# Patient Record
Sex: MALE | Race: WHITE | NOT HISPANIC OR LATINO | URBAN - METROPOLITAN AREA
[De-identification: names, ages, dates, MRNs, and addresses within clinical notes are randomized per-mention and may not be internally consistent; named-entity substitution may affect disease eponyms.]

---

## 2018-03-06 ENCOUNTER — OUTPATIENT (OUTPATIENT)
Dept: OUTPATIENT SERVICES | Facility: HOSPITAL | Age: 33
LOS: 1 days | Discharge: HOME | End: 2018-03-06

## 2018-03-06 DIAGNOSIS — E78.00 PURE HYPERCHOLESTEROLEMIA, UNSPECIFIED: ICD-10-CM

## 2019-01-02 ENCOUNTER — EMERGENCY (EMERGENCY)
Facility: HOSPITAL | Age: 34
LOS: 0 days | Discharge: LEFT BEFORE DISCHARGE | End: 2019-01-02
Attending: EMERGENCY MEDICINE | Admitting: EMERGENCY MEDICINE

## 2019-01-02 VITALS
RESPIRATION RATE: 18 BRPM | DIASTOLIC BLOOD PRESSURE: 81 MMHG | HEART RATE: 83 BPM | WEIGHT: 225.09 LBS | HEIGHT: 72 IN | TEMPERATURE: 98 F | SYSTOLIC BLOOD PRESSURE: 120 MMHG

## 2019-01-02 DIAGNOSIS — J02.9 ACUTE PHARYNGITIS, UNSPECIFIED: ICD-10-CM

## 2019-01-02 DIAGNOSIS — Z91.018 ALLERGY TO OTHER FOODS: ICD-10-CM

## 2019-01-02 NOTE — ED ADULT TRIAGE NOTE - CHIEF COMPLAINT QUOTE
"I have been on a z back for 5 days from urgent care, I feel no better, I still have a very sore throat, runny nose and a bad cough"

## 2023-07-27 ENCOUNTER — INPATIENT (INPATIENT)
Facility: HOSPITAL | Age: 38
LOS: 1 days | Discharge: HOME HEALTH | DRG: 299 | End: 2023-07-29
Attending: INTERNAL MEDICINE | Admitting: INTERNAL MEDICINE
Payer: COMMERCIAL

## 2023-07-27 VITALS
RESPIRATION RATE: 18 BRPM | HEIGHT: 72 IN | TEMPERATURE: 98 F | OXYGEN SATURATION: 100 % | DIASTOLIC BLOOD PRESSURE: 106 MMHG | WEIGHT: 227.08 LBS | HEART RATE: 74 BPM | SYSTOLIC BLOOD PRESSURE: 178 MMHG

## 2023-07-27 DIAGNOSIS — Z90.49 ACQUIRED ABSENCE OF OTHER SPECIFIED PARTS OF DIGESTIVE TRACT: Chronic | ICD-10-CM

## 2023-07-27 DIAGNOSIS — R07.9 CHEST PAIN, UNSPECIFIED: ICD-10-CM

## 2023-07-27 LAB
ALBUMIN SERPL ELPH-MCNC: 4.6 G/DL — SIGNIFICANT CHANGE UP (ref 3.5–5.2)
ALP SERPL-CCNC: 93 U/L — SIGNIFICANT CHANGE UP (ref 30–115)
ALT FLD-CCNC: 45 U/L — HIGH (ref 0–41)
ANION GAP SERPL CALC-SCNC: 13 MMOL/L — SIGNIFICANT CHANGE UP (ref 7–14)
APTT BLD: 142.2 SEC — CRITICAL HIGH (ref 27–39.2)
APTT BLD: 36.4 SEC — SIGNIFICANT CHANGE UP (ref 27–39.2)
AST SERPL-CCNC: 26 U/L — SIGNIFICANT CHANGE UP (ref 0–41)
BASOPHILS # BLD AUTO: 0.02 K/UL — SIGNIFICANT CHANGE UP (ref 0–0.2)
BASOPHILS NFR BLD AUTO: 0.3 % — SIGNIFICANT CHANGE UP (ref 0–1)
BILIRUB SERPL-MCNC: 0.6 MG/DL — SIGNIFICANT CHANGE UP (ref 0.2–1.2)
BUN SERPL-MCNC: 9 MG/DL — LOW (ref 10–20)
CALCIUM SERPL-MCNC: 8.8 MG/DL — SIGNIFICANT CHANGE UP (ref 8.4–10.5)
CHLORIDE SERPL-SCNC: 101 MMOL/L — SIGNIFICANT CHANGE UP (ref 98–110)
CHOLEST SERPL-MCNC: 176 MG/DL — SIGNIFICANT CHANGE UP
CK MB CFR SERPL CALC: <1 NG/ML — SIGNIFICANT CHANGE UP (ref 0.6–6.3)
CK SERPL-CCNC: 88 U/L — SIGNIFICANT CHANGE UP (ref 0–225)
CO2 SERPL-SCNC: 26 MMOL/L — SIGNIFICANT CHANGE UP (ref 17–32)
CREAT SERPL-MCNC: 0.9 MG/DL — SIGNIFICANT CHANGE UP (ref 0.7–1.5)
EGFR: 113 ML/MIN/1.73M2 — SIGNIFICANT CHANGE UP
EOSINOPHIL # BLD AUTO: 0.24 K/UL — SIGNIFICANT CHANGE UP (ref 0–0.7)
EOSINOPHIL NFR BLD AUTO: 3.4 % — SIGNIFICANT CHANGE UP (ref 0–8)
GLUCOSE SERPL-MCNC: 120 MG/DL — HIGH (ref 70–99)
HCT VFR BLD CALC: 48.8 % — SIGNIFICANT CHANGE UP (ref 42–52)
HDLC SERPL-MCNC: 36 MG/DL — LOW
HGB BLD-MCNC: 16.6 G/DL — SIGNIFICANT CHANGE UP (ref 14–18)
IMM GRANULOCYTES NFR BLD AUTO: 0.3 % — SIGNIFICANT CHANGE UP (ref 0.1–0.3)
INR BLD: 1.09 RATIO — SIGNIFICANT CHANGE UP (ref 0.65–1.3)
INR BLD: 1.15 RATIO — SIGNIFICANT CHANGE UP (ref 0.65–1.3)
LACTATE SERPL-SCNC: 1.1 MMOL/L — SIGNIFICANT CHANGE UP (ref 0.7–2)
LIPID PNL WITH DIRECT LDL SERPL: 122 MG/DL — HIGH
LYMPHOCYTES # BLD AUTO: 2.68 K/UL — SIGNIFICANT CHANGE UP (ref 1.2–3.4)
LYMPHOCYTES # BLD AUTO: 37.7 % — SIGNIFICANT CHANGE UP (ref 20.5–51.1)
MAGNESIUM SERPL-MCNC: 2 MG/DL — SIGNIFICANT CHANGE UP (ref 1.8–2.4)
MCHC RBC-ENTMCNC: 29.2 PG — SIGNIFICANT CHANGE UP (ref 27–31)
MCHC RBC-ENTMCNC: 34 G/DL — SIGNIFICANT CHANGE UP (ref 32–37)
MCV RBC AUTO: 85.9 FL — SIGNIFICANT CHANGE UP (ref 80–94)
MONOCYTES # BLD AUTO: 0.64 K/UL — HIGH (ref 0.1–0.6)
MONOCYTES NFR BLD AUTO: 9 % — SIGNIFICANT CHANGE UP (ref 1.7–9.3)
NEUTROPHILS # BLD AUTO: 3.5 K/UL — SIGNIFICANT CHANGE UP (ref 1.4–6.5)
NEUTROPHILS NFR BLD AUTO: 49.3 % — SIGNIFICANT CHANGE UP (ref 42.2–75.2)
NON HDL CHOLESTEROL: 140 MG/DL — HIGH
NRBC # BLD: 0 /100 WBCS — SIGNIFICANT CHANGE UP (ref 0–0)
PLATELET # BLD AUTO: 234 K/UL — SIGNIFICANT CHANGE UP (ref 130–400)
PMV BLD: 8.5 FL — SIGNIFICANT CHANGE UP (ref 7.4–10.4)
POTASSIUM SERPL-MCNC: 4 MMOL/L — SIGNIFICANT CHANGE UP (ref 3.5–5)
POTASSIUM SERPL-SCNC: 4 MMOL/L — SIGNIFICANT CHANGE UP (ref 3.5–5)
PROT SERPL-MCNC: 7.1 G/DL — SIGNIFICANT CHANGE UP (ref 6–8)
PROTHROM AB SERPL-ACNC: 12.5 SEC — SIGNIFICANT CHANGE UP (ref 9.95–12.87)
PROTHROM AB SERPL-ACNC: 13.2 SEC — HIGH (ref 9.95–12.87)
RBC # BLD: 5.68 M/UL — SIGNIFICANT CHANGE UP (ref 4.7–6.1)
RBC # FLD: 12.5 % — SIGNIFICANT CHANGE UP (ref 11.5–14.5)
SODIUM SERPL-SCNC: 140 MMOL/L — SIGNIFICANT CHANGE UP (ref 135–146)
TRIGL SERPL-MCNC: 92 MG/DL — SIGNIFICANT CHANGE UP
TROPONIN T SERPL-MCNC: <0.01 NG/ML — SIGNIFICANT CHANGE UP
WBC # BLD: 7.1 K/UL — SIGNIFICANT CHANGE UP (ref 4.8–10.8)
WBC # FLD AUTO: 7.1 K/UL — SIGNIFICANT CHANGE UP (ref 4.8–10.8)

## 2023-07-27 PROCEDURE — 85027 COMPLETE CBC AUTOMATED: CPT

## 2023-07-27 PROCEDURE — 86147 CARDIOLIPIN ANTIBODY EA IG: CPT

## 2023-07-27 PROCEDURE — 71275 CT ANGIOGRAPHY CHEST: CPT | Mod: 26,MA

## 2023-07-27 PROCEDURE — 81270 JAK2 GENE: CPT

## 2023-07-27 PROCEDURE — 93306 TTE W/DOPPLER COMPLETE: CPT

## 2023-07-27 PROCEDURE — 99222 1ST HOSP IP/OBS MODERATE 55: CPT

## 2023-07-27 PROCEDURE — 99223 1ST HOSP IP/OBS HIGH 75: CPT

## 2023-07-27 PROCEDURE — 85307 ASSAY ACTIVATED PROTEIN C: CPT

## 2023-07-27 PROCEDURE — 85303 CLOT INHIBIT PROT C ACTIVITY: CPT

## 2023-07-27 PROCEDURE — 85730 THROMBOPLASTIN TIME PARTIAL: CPT

## 2023-07-27 PROCEDURE — 85610 PROTHROMBIN TIME: CPT

## 2023-07-27 PROCEDURE — 85613 RUSSELL VIPER VENOM DILUTED: CPT

## 2023-07-27 PROCEDURE — 99285 EMERGENCY DEPT VISIT HI MDM: CPT

## 2023-07-27 PROCEDURE — 36415 COLL VENOUS BLD VENIPUNCTURE: CPT

## 2023-07-27 PROCEDURE — 86146 BETA-2 GLYCOPROTEIN ANTIBODY: CPT

## 2023-07-27 PROCEDURE — 71046 X-RAY EXAM CHEST 2 VIEWS: CPT | Mod: 26

## 2023-07-27 PROCEDURE — 85306 CLOT INHIBIT PROT S FREE: CPT

## 2023-07-27 PROCEDURE — 85300 ANTITHROMBIN III ACTIVITY: CPT

## 2023-07-27 PROCEDURE — 74174 CTA ABD&PLVS W/CONTRAST: CPT | Mod: 26,MA

## 2023-07-27 PROCEDURE — 93010 ELECTROCARDIOGRAM REPORT: CPT

## 2023-07-27 PROCEDURE — 80061 LIPID PANEL: CPT

## 2023-07-27 PROCEDURE — 80048 BASIC METABOLIC PNL TOTAL CA: CPT

## 2023-07-27 PROCEDURE — 93306 TTE W/DOPPLER COMPLETE: CPT | Mod: 26

## 2023-07-27 PROCEDURE — 83090 ASSAY OF HOMOCYSTEINE: CPT

## 2023-07-27 PROCEDURE — 85301 ANTITHROMBIN III ANTIGEN: CPT

## 2023-07-27 PROCEDURE — 81240 F2 GENE: CPT

## 2023-07-27 PROCEDURE — 82553 CREATINE MB FRACTION: CPT

## 2023-07-27 PROCEDURE — 82550 ASSAY OF CK (CPK): CPT

## 2023-07-27 PROCEDURE — 93005 ELECTROCARDIOGRAM TRACING: CPT

## 2023-07-27 PROCEDURE — 80053 COMPREHEN METABOLIC PANEL: CPT

## 2023-07-27 PROCEDURE — 84484 ASSAY OF TROPONIN QUANT: CPT

## 2023-07-27 PROCEDURE — 88184 FLOWCYTOMETRY/ TC 1 MARKER: CPT

## 2023-07-27 RX ORDER — NITROGLYCERIN 6.5 MG
0.4 CAPSULE, EXTENDED RELEASE ORAL ONCE
Refills: 0 | Status: COMPLETED | OUTPATIENT
Start: 2023-07-27 | End: 2023-07-27

## 2023-07-27 RX ORDER — MORPHINE SULFATE 50 MG/1
2 CAPSULE, EXTENDED RELEASE ORAL EVERY 6 HOURS
Refills: 0 | Status: DISCONTINUED | OUTPATIENT
Start: 2023-07-27 | End: 2023-07-27

## 2023-07-27 RX ORDER — PANTOPRAZOLE SODIUM 20 MG/1
40 TABLET, DELAYED RELEASE ORAL
Refills: 0 | Status: DISCONTINUED | OUTPATIENT
Start: 2023-07-27 | End: 2023-07-29

## 2023-07-27 RX ORDER — HEPARIN SODIUM 5000 [USP'U]/ML
1500 INJECTION INTRAVENOUS; SUBCUTANEOUS
Qty: 25000 | Refills: 0 | Status: DISCONTINUED | OUTPATIENT
Start: 2023-07-27 | End: 2023-07-28

## 2023-07-27 RX ORDER — CHLORHEXIDINE GLUCONATE 213 G/1000ML
1 SOLUTION TOPICAL
Refills: 0 | Status: DISCONTINUED | OUTPATIENT
Start: 2023-07-27 | End: 2023-07-29

## 2023-07-27 RX ORDER — MORPHINE SULFATE 50 MG/1
4 CAPSULE, EXTENDED RELEASE ORAL EVERY 6 HOURS
Refills: 0 | Status: DISCONTINUED | OUTPATIENT
Start: 2023-07-27 | End: 2023-07-29

## 2023-07-27 RX ORDER — HEPARIN SODIUM 5000 [USP'U]/ML
8500 INJECTION INTRAVENOUS; SUBCUTANEOUS ONCE
Refills: 0 | Status: COMPLETED | OUTPATIENT
Start: 2023-07-27 | End: 2023-07-27

## 2023-07-27 RX ORDER — HEPARIN SODIUM 5000 [USP'U]/ML
4000 INJECTION INTRAVENOUS; SUBCUTANEOUS EVERY 6 HOURS
Refills: 0 | Status: DISCONTINUED | OUTPATIENT
Start: 2023-07-27 | End: 2023-07-28

## 2023-07-27 RX ORDER — HEPARIN SODIUM 5000 [USP'U]/ML
15 INJECTION INTRAVENOUS; SUBCUTANEOUS
Qty: 25000 | Refills: 0 | Status: DISCONTINUED | OUTPATIENT
Start: 2023-07-27 | End: 2023-07-27

## 2023-07-27 RX ORDER — HEPARIN SODIUM 5000 [USP'U]/ML
INJECTION INTRAVENOUS; SUBCUTANEOUS
Qty: 25000 | Refills: 0 | Status: DISCONTINUED | OUTPATIENT
Start: 2023-07-27 | End: 2023-07-27

## 2023-07-27 RX ORDER — ENOXAPARIN SODIUM 100 MG/ML
100 INJECTION SUBCUTANEOUS ONCE
Refills: 0 | Status: DISCONTINUED | OUTPATIENT
Start: 2023-07-27 | End: 2023-07-27

## 2023-07-27 RX ORDER — ACETAMINOPHEN 500 MG
650 TABLET ORAL EVERY 6 HOURS
Refills: 0 | Status: DISCONTINUED | OUTPATIENT
Start: 2023-07-27 | End: 2023-07-29

## 2023-07-27 RX ORDER — SODIUM CHLORIDE 9 MG/ML
1000 INJECTION INTRAMUSCULAR; INTRAVENOUS; SUBCUTANEOUS ONCE
Refills: 0 | Status: COMPLETED | OUTPATIENT
Start: 2023-07-27 | End: 2023-07-27

## 2023-07-27 RX ORDER — HEPARIN SODIUM 5000 [USP'U]/ML
8500 INJECTION INTRAVENOUS; SUBCUTANEOUS EVERY 6 HOURS
Refills: 0 | Status: DISCONTINUED | OUTPATIENT
Start: 2023-07-27 | End: 2023-07-28

## 2023-07-27 RX ORDER — SIMVASTATIN 20 MG/1
5 TABLET, FILM COATED ORAL AT BEDTIME
Refills: 0 | Status: DISCONTINUED | OUTPATIENT
Start: 2023-07-27 | End: 2023-07-29

## 2023-07-27 RX ORDER — MORPHINE SULFATE 50 MG/1
4 CAPSULE, EXTENDED RELEASE ORAL ONCE
Refills: 0 | Status: DISCONTINUED | OUTPATIENT
Start: 2023-07-27 | End: 2023-07-27

## 2023-07-27 RX ADMIN — HEPARIN SODIUM 1800 UNIT(S)/HR: 5000 INJECTION INTRAVENOUS; SUBCUTANEOUS at 19:45

## 2023-07-27 RX ADMIN — Medication 0.4 MILLIGRAM(S): at 07:50

## 2023-07-27 RX ADMIN — MORPHINE SULFATE 4 MILLIGRAM(S): 50 CAPSULE, EXTENDED RELEASE ORAL at 10:32

## 2023-07-27 RX ADMIN — Medication 650 MILLIGRAM(S): at 21:11

## 2023-07-27 RX ADMIN — HEPARIN SODIUM 1800 UNIT(S)/HR: 5000 INJECTION INTRAVENOUS; SUBCUTANEOUS at 13:18

## 2023-07-27 RX ADMIN — HEPARIN SODIUM 8500 UNIT(S): 5000 INJECTION INTRAVENOUS; SUBCUTANEOUS at 13:18

## 2023-07-27 RX ADMIN — HEPARIN SODIUM 15 UNIT(S)/HR: 5000 INJECTION INTRAVENOUS; SUBCUTANEOUS at 22:10

## 2023-07-27 RX ADMIN — MORPHINE SULFATE 2 MILLIGRAM(S): 50 CAPSULE, EXTENDED RELEASE ORAL at 18:58

## 2023-07-27 RX ADMIN — SIMVASTATIN 5 MILLIGRAM(S): 20 TABLET, FILM COATED ORAL at 22:16

## 2023-07-27 RX ADMIN — MORPHINE SULFATE 4 MILLIGRAM(S): 50 CAPSULE, EXTENDED RELEASE ORAL at 08:11

## 2023-07-27 RX ADMIN — Medication 650 MILLIGRAM(S): at 22:20

## 2023-07-27 RX ADMIN — HEPARIN SODIUM 1800 UNIT(S)/HR: 5000 INJECTION INTRAVENOUS; SUBCUTANEOUS at 18:54

## 2023-07-27 RX ADMIN — MORPHINE SULFATE 2 MILLIGRAM(S): 50 CAPSULE, EXTENDED RELEASE ORAL at 19:28

## 2023-07-27 RX ADMIN — HEPARIN SODIUM 0 UNIT(S)/HR: 5000 INJECTION INTRAVENOUS; SUBCUTANEOUS at 21:08

## 2023-07-27 RX ADMIN — SODIUM CHLORIDE 1000 MILLILITER(S): 9 INJECTION INTRAMUSCULAR; INTRAVENOUS; SUBCUTANEOUS at 08:11

## 2023-07-27 NOTE — CONSULT NOTE ADULT - ASSESSMENT
38 y/o male with pmhx of HLD non compliant with medication, GERD s/p EGD 10 yrs ago negative for gastritis,  s/p lap yoselyn 10 yrs ago, ? ventricular thickening had ECHO 10 yrs ago, presents with chest pain.      Impression:  #Chest pain: ACS ruled out  #HLD  #Left gastric artery thrombus      Pt still with midsternal CP 2/10 in intensity. Pain appears atypical in nature  Trop flat x2  ECG: Sinus, with no acute ischemic changes  Cxr: Unremarkable      Plan:  Check lipid profile, HgA1C  Obtain TTE  Repeat ECG in am  Recommend inpatient CCTA. Can do tomorrow 7/28. NPO after midnight. Give 50mg or 75 mg x1 of PO Metoprolol prior to CCTA to slow HR  Cont w/ home meds  Monitor lytes          #Cardiac risk stratification for EGD   -This consult serves only as a perioperative cardiac risk stratification and evaluation to predict 30-day cardiac complications risk and mortality.  The decision to proceed with the surgery/procedure is made by the performing physician and the patient.     -Full risk stratification pending ischemic workup         Discussed with Dr Santos

## 2023-07-27 NOTE — CONSULT NOTE ADULT - REASON FOR ADMISSION
chest pain, thrombus of gastric artery
chest pain, thrombus of gastric artery
cp / epigastric pain
chest pain, thrombus of gastric artery

## 2023-07-27 NOTE — H&P ADULT - NSHPREVIEWOFSYSTEMS_GEN_ALL_CORE
REVIEW OF SYSTEMS:    CONSTITUTIONAL: No weakness, fevers or chills  EYES/ENT: No visual changes;  No vertigo or throat pain   NECK: No pain or stiffness  RESPIRATORY: No cough, wheezing, hemoptysis; No shortness of breath  CARDIOVASCULAR: Positive for chest pain, negative for palpitations  GASTROINTESTINAL: No abdominal or epigastric pain. No nausea, vomiting, or hematemesis; Positive  diarrhea.  No melena or hematochezia.  GENITOURINARY: No dysuria, frequency or hematuria  NEUROLOGICAL: No numbness or weakness  SKIN: No itching, rashes

## 2023-07-27 NOTE — ED PROVIDER NOTE - CLINICAL SUMMARY MEDICAL DECISION MAKING FREE TEXT BOX
Patient presented with epigastric chest and back pain with soft stool, exam as above, labs and studies appreciated, will admit for anticoagulation and further evaluation

## 2023-07-27 NOTE — CONSULT NOTE ADULT - ASSESSMENT
A 36 yo male with h/o HLD non compliant with medication, GERD s/p EGD 10 yrs ago negative for gastritis,  s/p lap yoselyn 10 yrs ago, ? ventricular thickening had ECHO 10 yrs ago follows up with Dr. Prince presents with chest pain. Pt report woke up at 5 am with back pain, then realized was chest pain.    CT Angio Chest Aorta w/wo IV Cont showed No intramural hematoma or aortic dissection. 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  Poorly opacified intrahepatic left hepatic artery from the gastric artery is also concerning for critical subtotal or complete occlusion.    Vascular and cardiology consult notes appreciated.   Continue Heparin drip. Monitor PTT to adjust dose.  Order hypercoagulable workup: anticardiolipin IgG and IgM, beta 2 glycoprotein I IgG and IgM, HALLEY-2 mutation, CD55 and CD59,  Factor V Leiden, Prothrombin gene mutation.  Will hold testing on Protein C activity, Protein S Ag and activity, ATII  since Heparin can affect test results.   Followup with vascular.

## 2023-07-27 NOTE — ED ADULT NURSE NOTE - NSFALLUNIVINTERV_ED_ALL_ED
Bed/Stretcher in lowest position, wheels locked, appropriate side rails in place/Call bell, personal items and telephone in reach/Instruct patient to call for assistance before getting out of bed/chair/stretcher/Non-slip footwear applied when patient is off stretcher/[place] to call system/Physically safe environment - no spills, clutter or unnecessary equipment/Purposeful proactive rounding/Room/bathroom lighting operational, light cord in reach

## 2023-07-27 NOTE — H&P ADULT - NS ATTEND AMEND GEN_ALL_CORE FT
Seen and evaluated at bedside.     Vitals reviewed    Lab and radiology results reviewed    Assessment     #Critical stenosis/nonocclusive thrombus of the left gastric artery,   #Chest pain due to above  #HLD  #Resolving diarrhea- non infectious      Plan    # Admit to telemetry  # received a dose of therapeutic Lovenox in ER  # vascular consulted and recommendations noted. Will follow recommendation.  # Hematology, GI and cardiology consulted.   # Start heparin as recommended by vascular team.  # Echo w bubble per vascular recommendation  # Monitor labs and vitals.   # Trend troponin  # Hypercoagulable work up Seen and evaluated at bedside.     Vitals reviewed    Lab and radiology results reviewed    Assessment     #Critical stenosis/nonocclusive thrombus of the left gastric artery,   #Chest pain due to above  #HLD  #Resolving diarrhea- non infectious      Plan    # Admit to telemetry  # received a dose of therapeutic Lovenox in ER  # vascular consulted and recommendations noted. Will follow recommendation.  # Hematology, GI and cardiology consulted.   # Start heparin as recommended by vascular team.  # Echo w bubble per vascular recommendation  # Monitor labs and vitals.   # Trend troponin  # Hypercoagulable work up    Addendum: Call from GI attending requesting cardiac and hematology eval and clearance for urgent endoscopy. GI and card notified.

## 2023-07-27 NOTE — ED PROVIDER NOTE - OBJECTIVE STATEMENT
37 year old male past medical history of HLD comes to emergency room for chest pain. patient states that he woke up having discomfort in upper back but than realized it wasn't his back but the chest. no shortness of breath. no fever/chills. no leg pain or swelling

## 2023-07-27 NOTE — CONSULT NOTE ADULT - SUBJECTIVE AND OBJECTIVE BOX
HPI:  A 36 y/o male with pmhx of HLD non compliant with medication, GERD s/p EGD 10 yrs ago negative for gastritis,  s/p lap yoselyn 10 yrs ago, ? ventricular thickening had ECHO 10 yrs ago follows up with Dr. Prince presents with chest pain. Pt report woke up at 5 am with back pain, then realized was chest pain. Pt reports chest pain 6/10 initially constant. PT reports pain improved after morphine and nitroglycerine.  Pt denies palpitation. Pt  denies numbness or tingling to the arms. PT denies calf pain. Pt denies headache or blurry vision. Pt reports family hx of DVT grandparent. Pt reports diarrhea for few days, non bloody, 4 episodes yesterday , improved after Pepto-Bismol. PT denies fever, chills, shortness of breath, burning with urination.  (27 Jul 2023 11:05)        HPI-Cardiology   Pt with the above Hx, evaluated at bedside. Pt presented for eval of CP since this am. Pt states that back pain woke up him from sleep this am, which radiated midsternally and mid-epigastric area. Pt describes the pain as "achy", 10/10 in intensity, which subsided after receiving morphine and nitro to 2/10. Pt denies similar episodes in the past, and denies any aggravating factors. Denies SOB, palpitations dizziness/lightheadedness or nausea/vomiting. Radiology tests and hospital records, were reviewed, as well as previous notes on this patient.      PAST MEDICAL & SURGICAL HISTORY  No pertinent past medical history    HLD (hyperlipidemia)    S/P cholecystectomy        FAMILY HISTORY:  FAMILY HISTORY:  unknown      SOCIAL HISTORY:  []smoker-denies  []Alcohol-denies  []Drug-denies      ALLERGIES:  Nuts (Unknown)  No Known Drug Allergies      MEDICATIONS:  MEDICATIONS  (STANDING):  chlorhexidine 2% Cloths 1 Application(s) Topical <User Schedule>  heparin   Injectable 8500 Unit(s) IV Push once  heparin  Infusion.  Unit(s)/Hr (18 mL/Hr) IV Continuous <Continuous>  pantoprazole    Tablet 40 milliGRAM(s) Oral before breakfast  simvastatin 5 milliGRAM(s) Oral at bedtime    MEDICATIONS  (PRN):  acetaminophen     Tablet .. 650 milliGRAM(s) Oral every 6 hours PRN Temp greater or equal to 38C (100.4F), Mild Pain (1 - 3)  heparin   Injectable 8500 Unit(s) IV Push every 6 hours PRN For aPTT less than 40  heparin   Injectable 4000 Unit(s) IV Push every 6 hours PRN For aPTT between 40 - 57      HOME MEDICATIONS:  Home Medications:  simvastatin 10 mg oral tablet: 0.5 orally once a day (27 Jul 2023 11:28)      VITALS:   T(F): 97.5 (07-27 @ 05:27), Max: 97.5 (07-27 @ 05:27)  HR: 88 (07-27 @ 08:24) (74 - 92)  BP: 140/76 (07-27 @ 08:24) (140/76 - 182/95)  BP(mean): --  RR: 18 (07-27 @ 08:24) (18 - 18)  SpO2: 97% (07-27 @ 08:24) (97% - 100%)          REVIEW OF SYSTEMS:  See HPI      PHYSICAL EXAM:  NEURO: patient is awake , alert and oriented  GEN: Not in acute distress  NECK: no thyroid enlargement, no JVD  LUNGS: Clear to auscultation bilaterally   CARDIOVASCULAR: S1/S2 present, RRR , no murmurs or rubs, no carotid bruits,  + PP bilaterally  ABD: Soft, non-tender, non-distended, +BS  EXT: No SALENA  SKIN: Intact        LABS:                        16.6   7.10  )-----------( 234      ( 27 Jul 2023 05:55 )             48.8     07-27    140  |  101  |  9<L>  ----------------------------<  120<H>  4.0   |  26  |  0.9    Ca    8.8      27 Jul 2023 05:55  Mg     2.0     07-27    TPro  7.1  /  Alb  4.6  /  TBili  0.6  /  DBili  x   /  AST  26  /  ALT  45<H>  /  AlkPhos  93  07-27    PT/INR - ( 27 Jul 2023 08:00 )   PT: 12.50 sec;   INR: 1.09 ratio         PTT - ( 27 Jul 2023 08:00 )  PTT:36.4 sec  Lactate, Blood: 1.1 mmol/L (07-27-23 @ 08:00)  Troponin T, Serum: <0.01 ng/mL (07-27-23 @ 08:00)  Troponin T, Serum: <0.01 ng/mL (07-27-23 @ 05:55)    CARDIAC MARKERS ( 27 Jul 2023 08:00 )  x     / <0.01 ng/mL / x     / x     / x      CARDIAC MARKERS ( 27 Jul 2023 05:55 )  x     / <0.01 ng/mL / x     / x     / x              RADIOLOGY:  < from: CT Angio Chest Aorta w/wo IV Cont (07.27.23 @ 06:31) >    IMPRESSION:    1.  No intramural hematoma or aortic dissection.  2.  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  3.  Poorly opacified intrahepatic left hepatic artery from the gastric   artery is also concerning for critical subtotal or complete occlusion.    Spoke with DYLAN TERRELL DO on 7/27/2023 7:25 AM.    --- End of Report ---    < end of copied text >            < from: Xray Chest 2 Views PA/Lat (07.27.23 @ 06:28) >  Impression:    No radiographic evidence of acute cardiopulmonary disease.        --- End of Report ---      < end of copied text >        ECG:  < from: 12 Lead ECG (07.27.23 @ 07:16) >   Normal sinus rhythm  Normal ECG    Confirmed by VERONICA VILLEGAS MD ([id number]) on 7/27/2023 11:24:23 AM    < end of copied text >

## 2023-07-27 NOTE — ED PROVIDER NOTE - WR ORDER STATUS 1
Performed This was a shared visit with the SANDRA. I reviewed and verified the documentation and independently performed the documented: Resulted

## 2023-07-27 NOTE — PATIENT PROFILE ADULT - FALL HARM RISK - UNIVERSAL INTERVENTIONS
Bed in lowest position, wheels locked, appropriate side rails in place/Call bell, personal items and telephone in reach/Instruct patient to call for assistance before getting out of bed or chair/Non-slip footwear when patient is out of bed/[place] to call system/Physically safe environment - no spills, clutter or unnecessary equipment/Purposeful Proactive Rounding/Room/bathroom lighting operational, light cord in reach

## 2023-07-27 NOTE — CONSULT NOTE ADULT - SUBJECTIVE AND OBJECTIVE BOX
vasc surg consulted CT findings     37 year old male past medical history of HLD ( non-compliant with meds ) comes to emergency room for chest pain. patient states that he woke up having discomfort in upper back but than realized it wasn't his back but the chest. no shortness of breath. no fever/chills. no leg pain or swelling./ blood clots / fam hx of blood clots   Pt required opiates for pain relief   pt does have unexplained diarrhea on / off 10 days otherwise healthy   denies tob/ etoh / drugs     pmhx: hld     psx : CCY lap 9 years ago with no post complications     Vital Signs Last 24 Hrs  T(C): 36.4 (27 Jul 2023 05:27), Max: 36.4 (27 Jul 2023 05:27)  T(F): 97.5 (27 Jul 2023 05:27), Max: 97.5 (27 Jul 2023 05:27)  HR: 88 (27 Jul 2023 08:24) (74 - 92)  BP: 140/76 (27 Jul 2023 08:24) (140/76 - 182/95)  BP(mean): --  RR: 18 (27 Jul 2023 08:24) (18 - 18)  SpO2: 97% (27 Jul 2023 08:24) (97% - 100%)    Parameters below as of 27 Jul 2023 08:24  Patient On (Oxygen Delivery Method): room air    Constitutional: well-nourished, appears stated age, no acute distress  Eyes: Conjunctiva pink, Sclera clear, PERRLA, EOMI.  Cardiovascular: S1 and S2, regular rate, regular rhythm, well-perfused extremities, radial pulses equal and 2+  Respiratory: unlabored respiratory effort, clear to auscultation bilaterally no wheezing, rales and rhonchi  Gastrointestinal: soft, non-tender abdomen, no pulsatile mass, normal bowl sounds  Musculoskeletal: supple neck, no lower extremity edema, no midline tenderness  Integumentary: warm, dry, no rash  Neurologic: awake, alert, cranial nerves II-XII grossly intact, extremities’ motor and sensory functions grossly intact  Psychiatric: appropriate mood, appropriate affect    07-27    140  |  101  |  9<L>  ----------------------------<  120<H>  4.0   |  26  |  0.9    Ca    8.8      27 Jul 2023 05:55  Mg     2.0     07-27    TPro  7.1  /  Alb  4.6  /  TBili  0.6  /  DBili  x   /  AST  26  /  ALT  45<H>  /  AlkPhos  93  07-27                            16.6   7.10  )-----------( 234      ( 27 Jul 2023 05:55 )             48.8     CAPILLARY BLOOD GLUCOSE        CARDIAC MARKERS ( 27 Jul 2023 08:00 )  x     / <0.01 ng/mL / x     / x     / x      CARDIAC MARKERS ( 27 Jul 2023 05:55 )  x     / <0.01 ng/mL / x     / x     / x        < from: CT Angio Chest Aorta w/wo IV Cont (07.27.23 @ 06:31) >  IMPRESSION:    1.  No intramural hematoma or aortic dissection.  2.  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  3.  Poorly opacified intrahepatic left hepatic artery from the gastric   artery is also concerning for critical subtotal or complete occlusion.    Spoke with DYLAN TERRELL DO on 7/27/2023 7:25 AM.    < end of copied text >

## 2023-07-27 NOTE — H&P ADULT - NSHPPHYSICALEXAM_GEN_ALL_CORE
VITALS:     ICU Vital Signs Last 24 Hrs  T(C): 36.4 (27 Jul 2023 05:27), Max: 36.4 (27 Jul 2023 05:27)  T(F): 97.5 (27 Jul 2023 05:27), Max: 97.5 (27 Jul 2023 05:27)  HR: 88 (27 Jul 2023 08:24) (74 - 92)  BP: 140/76 (27 Jul 2023 08:24) (140/76 - 182/95)  RR: 18 (27 Jul 2023 08:24) (18 - 18)  SpO2: 97% (27 Jul 2023 08:24) (97% - 100%)    O2 Parameters below as of 27 Jul 2023 08:24  Patient On (Oxygen Delivery Method): room air        GENERAL: NAD, lying in bed comfortably  HEAD:  Atraumatic, Normocephalic  EYES: EOMI, PERRLA, conjunctiva and sclera clear  ENT: Moist mucous membranes  NECK: Supple, No JVD  CHEST/LUNG: no tenderness to palpation, Clear to auscultation bilaterally; No rales, rhonchi, wheezing, or rubs. Unlabored respirations, sat 98 % RA  HEART: Regular rate and rhythm; No murmurs, rubs, or gallops  ABDOMEN:  Soft, Nontender, Nondistended. No hepatomegally  EXTREMITIES: no edema or tenderness   NERVOUS SYSTEM:  Alert & Oriented X3, speech clear. No deficits   MSK: FROM all 4 extremities, full and equal strength  SKIN: No rashes or lesions VITALS:     ICU Vital Signs Last 24 Hrs  T(C): 36.4 (27 Jul 2023 05:27), Max: 36.4 (27 Jul 2023 05:27)  T(F): 97.5 (27 Jul 2023 05:27), Max: 97.5 (27 Jul 2023 05:27)  HR: 88 (27 Jul 2023 08:24) (74 - 92)  BP: 140/76 (27 Jul 2023 08:24) (140/76 - 182/95)  RR: 18 (27 Jul 2023 08:24) (18 - 18)  SpO2: 97% (27 Jul 2023 08:24) (97% - 100%)    O2 Parameters below as of 27 Jul 2023 08:24  Patient On (Oxygen Delivery Method): room air      GENERAL: NAD, lying in bed comfortably  HEAD:  Atraumatic, Normocephalic  EYES: EOMI, PERRLA, conjunctiva and sclera clear  ENT: Moist mucous membranes  NECK: Supple, No JVD  CHEST/LUNG: no tenderness to palpation, Clear to auscultation bilaterally; No rales, rhonchi, wheezing, or rubs. Unlabored respirations, sat 98 % RA  HEART: Regular rate and rhythm; No murmurs, rubs, or gallops  ABDOMEN:  Soft, Nontender, Nondistended. No hepatomegaly  EXTREMITIES: no edema or tenderness   NERVOUS SYSTEM:  Alert & Oriented X3, speech clear. No deficits   MSK: FROM all 4 extremities, full and equal strength  SKIN: No rashes or lesions

## 2023-07-27 NOTE — ED PROVIDER NOTE - PROGRESS NOTE DETAILS
Patient with return of pain, mostly lower chest and upper abdomen, hypertensive, EKG with PVCs otherwise normal and unchanged from prior, will give nitroglycerin and reassess, awaiting official read of CT Surgical PA aware of vascular consult Authored by Dr. Alva: Patient reevaluated this morning and still reports midsternal chest pain despite getting nitro, will add on morphine.  Patient explained all abnormal findings on CAT scan.  Denies history of vasculitis or thrombosis/blood clots in the past in himself or family.  Consulted vascular surgery for further evaluation

## 2023-07-27 NOTE — CONSULT NOTE ADULT - ASSESSMENT
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  Poorly opacified intrahepatic left hepatic artery from the gastric   artery is also concerning for critical subtotal or complete occlusion.    - d/w vasc fellow Dr Hoover   - would admit and have cardiac w/u with echo and eval for arrythmia   - would also heparinize patient     above d/w pt and full consult to be completed by attending

## 2023-07-27 NOTE — CHART NOTE - NSCHARTNOTEFT_GEN_A_CORE
case discussed with radiology no evidence of bowel ischemia, gangrene, esophageal or gastric ischemia at current  -will await cardiology follow-up prior to scheduling EGD
PA Note:    Called to evaluate Patient for  worsening pain not controlled by current treatement     T(C): 36.1 (07-27-23 @ 20:47), Max: 36.4 (07-27-23 @ 05:27)  HR: 91 (07-27-23 @ 20:47) (60 - 92)  BP: 146/93 (07-27-23 @ 20:47) (125/58 - 182/95)  RR: 18 (07-27-23 @ 20:47) (16 - 18)  SpO2: 98% (07-27-23 @ 20:47) (97% - 100%)    37yMale    PHYSICAL EXAM:      Constitutional: NAD, A&O x3    Eyes: PERRLA, no conjuctivitis    Neck: no lymphadenopathy    Respiratory: +air entry, no rales, no rhonchi, no wheezes    Cardiovascular: +S1 and S2, regular rate and rhythm    Gastrointestinal: +BS, soft, non-tender, not distended    Extremities:  no edema, no calf tenderness    Skin: no rashes, normal turgor                            16.6   7.10  )-----------( 234      ( 27 Jul 2023 05:55 )             48.8     07-27    140  |  101  |  9<L>  ----------------------------<  120<H>  4.0   |  26  |  0.9    Ca    8.8      27 Jul 2023 05:55  Mg     2.0     07-27    TPro  7.1  /  Alb  4.6  /  TBili  0.6  /  DBili  x   /  AST  26  /  ALT  45<H>  /  AlkPhos  93  07-27          Urinalysis Basic - ( 27 Jul 2023 05:55 )    Color: x / Appearance: x / SG: x / pH: x  Gluc: 120 mg/dL / Ketone: x  / Bili: x / Urobili: x   Blood: x / Protein: x / Nitrite: x   Leuk Esterase: x / RBC: x / WBC x   Sq Epi: x / Non Sq Epi: x / Bacteria: x      PT/INR - ( 27 Jul 2023 20:07 )   PT: 13.20 sec;   INR: 1.15 ratio         PTT - ( 27 Jul 2023 20:07 )  PTT:142.2 sec  CARDIAC MARKERS ( 27 Jul 2023 16:12 )  x     / <0.01 ng/mL / 88 U/L / x     / <1.0 ng/mL  CARDIAC MARKERS ( 27 Jul 2023 08:00 )  x     / <0.01 ng/mL / x     / x     / x      CARDIAC MARKERS ( 27 Jul 2023 05:55 )  x     / <0.01 ng/mL / x     / x     / x          CAPILLARY BLOOD GLUCOSE                  Assessment:  acute  pain    Plan: increase morphine  from 2 to 4mg po q6 PRN      Notified:

## 2023-07-27 NOTE — H&P ADULT - HISTORY OF PRESENT ILLNESS
A 38 y/o male with pmhx of HLD non compliant with medication, GERD s/p EGD 10 yrs ago negative for gastritis,  s/p lap yoselyn 10 yrs ago, ? ventricular thickening had ECHO 10 yrs ago follows up with Dr. Prince presents with chest pain. Pt report woke up at 5 am with back pain, then realized was chest pain. Pt reports chest pain 6/10 initially constant. PT reports pain improved after morphine and nitroglycerine.  Pt denies palpitation. Pt  denies numbness or tingling to the arms. PT denies calf pain. Pt denies headache or blurry vision. Pt reports family hx of DVT grandparent. Pt reports diarrhea for few days, non bloody, 4 episodes yesterday , improved after Pepto-Bismol. PT denies fever, chills, shortness of breath, burning with urination.

## 2023-07-27 NOTE — ED PROVIDER NOTE - CHPI ED SYMPTOMS NEG
no cough/no dizziness/no fever/no nausea/no shortness of breath/no syncope/no vomiting/no chills/no diaphoresis
(0) understands/communicates without difficulty

## 2023-07-27 NOTE — CONSULT NOTE ADULT - SUBJECTIVE AND OBJECTIVE BOX
Chief complaint/Reason for consult: gastric artery thrombosis    HPI:  A 38 y/o male with pmhx of HLD non compliant with medication, GERD s/p EGD 10 yrs ago negative for gastritis,  s/p lap yoselyn 10 yrs ago, ? ventricular thickening had ECHO 10 yrs ago follows up with Dr. Prince presents with chest pain. Pt report woke up at 5 am with back pain, then realized was chest pain. Pt reports chest pain 6/10 initially constant. PT reports pain improved after morphine and nitroglycerine.  Pt denies palpitation. Pt  denies numbness or tingling to the arms. PT denies calf pain. Pt denies headache or blurry vision. Pt reports family hx of DVT grandparent. Pt reports diarrhea for few days, non bloody, 4 episodes yesterday , improved after Pepto-Bismol. PT denies fever, chills, shortness of breath, burning with urination.  (27 Jul 2023 11:05)    GI updates: 37yMale pmh HLD, lap choley 9 years ago presents for chest pain 7/10. Patient reports on and off diarrhea for one week, 4-5 episodes yesterday and one episode today. Currently Patient denies nausea, vomiting, hematemesis, melena, blood in stool,  constipation, abdominal pain.      PAST MEDICAL & SURGICAL HISTORY:   No pertinent past medical history      HLD (hyperlipidemia)      S/P cholecystectomy            Family history:  FAMILY HISTORY:    No GI cancers in first or second degree relatives    Social History: No smoking. No alcohol. No illegal drug use.    Allergies:   Nuts (Unknown)  No Known Drug Allergies      MEDICATIONS: Home Medications:  simvastatin 10 mg oral tablet: 0.5 orally once a day (27 Jul 2023 11:28)    MEDICATIONS  (STANDING):  chlorhexidine 2% Cloths 1 Application(s) Topical <User Schedule>  heparin   Injectable 8500 Unit(s) IV Push once  heparin  Infusion.  Unit(s)/Hr (18 mL/Hr) IV Continuous <Continuous>  pantoprazole    Tablet 40 milliGRAM(s) Oral before breakfast  simvastatin 5 milliGRAM(s) Oral at bedtime    MEDICATIONS  (PRN):  acetaminophen     Tablet .. 650 milliGRAM(s) Oral every 6 hours PRN Temp greater or equal to 38C (100.4F), Mild Pain (1 - 3)  heparin   Injectable 8500 Unit(s) IV Push every 6 hours PRN For aPTT less than 40  heparin   Injectable 4000 Unit(s) IV Push every 6 hours PRN For aPTT between 40 - 57        REVIEW OF SYSTEMS  General:  No weight loss, fevers, or chills.  Eyes:  No reported pain or visual changes  ENT:  No sore throat or runny nose.  NECK: No stiffness or lymphadenopathy  CV:  No chest pain or palpitations.  Resp:  No shortness of breath, cough, wheezing or hemoptysis  GI:  No abdominal pain, nausea, vomiting, dysphagia, diarrhea or constipation. No rectal bleeding, melena, or hematemesis.  Muscle:  No aches or weakness  Neuro:  No tingling, numbness       VITALS:   T(F): 97.5 (07-27-23 @ 05:27), Max: 97.5 (07-27-23 @ 05:27)  HR: 88 (07-27-23 @ 08:24) (74 - 92)  BP: 140/76 (07-27-23 @ 08:24) (140/76 - 182/95)  RR: 18 (07-27-23 @ 08:24) (18 - 18)  SpO2: 97% (07-27-23 @ 08:24) (97% - 100%)    PHYSICAL EXAM:  GENERAL: AAOx3, no acute distress.  HEAD:  Atraumatic, Normocephalic  EYES: conjunctiva and sclera clear  NECK: Supple, No thyromegaly   CHEST/LUNG: Clear to auscultation bilaterally; No wheeze, rhonchi, or rales  HEART: Regular rate and rhythm; normal S1, S2, No murmurs.  ABDOMEN: Soft, nontender, nondistended; Bowel sounds present  NEUROLOGY: No asterixis or tremor  SKIN: Intact, no jaundice          LABS:  07-27    140  |  101  |  9<L>  ----------------------------<  120<H>  4.0   |  26  |  0.9    Ca    8.8      27 Jul 2023 05:55  Mg     2.0     07-27    TPro  7.1  /  Alb  4.6  /  TBili  0.6  /  DBili  x   /  AST  26  /  ALT  45<H>  /  AlkPhos  93  07-27                          16.6   7.10  )-----------( 234      ( 27 Jul 2023 05:55 )             48.8     LIVER FUNCTIONS - ( 27 Jul 2023 05:55 )  Alb: 4.6 g/dL / Pro: 7.1 g/dL / ALK PHOS: 93 U/L / ALT: 45 U/L / AST: 26 U/L / GGT: x           PT/INR - ( 27 Jul 2023 08:00 )   PT: 12.50 sec;   INR: 1.09 ratio         PTT - ( 27 Jul 2023 08:00 )  PTT:36.4 sec    IMAGING:    < from: CT Angio Abdomen and Pelvis w/ IV Cont (07.27.23 @ 06:31) >    ACC: [id number] EXAM:  CT ANGIO ABD PELV (W)AW IC   ORDERED BY: PETER MARTINEZ     ACC: [id number] EXAM:  CT ANGIO CHEST AORTA WAWIC   ORDERED BY: ADEBAYO PRECIADO     PROCEDURE DATE:  07/27/2023          INTERPRETATION:  INDICATION: Evaluation for Aortic Dissection. Chest pain.    TECHNIQUE: CT of the chest, abdomen, and pelvis was performed from the   thoracic inlet to the level of the pubic symphysis before and following   the administration of 95 cc Omnipaque 350 intravenous contrast.. Oral   contrast was not administered. Coronal and sagittal images have been   submitted, as well as MIP reformats.    COMPARISON: CT heart 3/6/2018    FINDINGS:    CT CHEST:    VASCULAR: No intramural hematoma or aortic dissection. Critical   stenosis/nonocclusive thrombus of the proximal left gastric artery with   distal reconstitution (series 605, image 74 and series 604, image 51).   Replaced left hepatic artery (arising from the left gastric artery, an   anatomic variant) is nonopacified and also concerning for thrombus.    LUNGS/PLEURA/AIRWAYS: Patent central tracheobronchial tree. No evidence   of consolidation, pleural effusion, or pneumothorax.    MEDIASTINUM/LYMPH NODES: Visualized thyroid gland is symmetric. No   axillary, hilar, or mediastinal lymphadenopathy.    HEART/GREAT VESSELS: Normal heart size. No pericardial effusion. Normal   caliber main pulmonary artery. Normal caliber thoracic aorta.      CT ABDOMEN/PELVIS:    HEPATOBILIARY: Right hepatic lobe flash filling hemangioma.   Post-cholecystectomy.    SPLEEN: Unremarkable.    PANCREAS: Unremarkable.    ADRENAL GLANDS: Unremarkable.    KIDNEYS: Symmetric renal enhancement. No hydronephrosis.    ABDOMINOPELVIC NODES: Unremarkable.    PELVIC ORGANS: Unremarkable.    PERITONEUM/MESENTERY/BOWEL: No evidence of bowel obstruction,   pneumoperitoneum, or ascites. Unremarkable appendix.    BONES AND SOFT TISSUES: Unremarkable.      IMPRESSION:    1.  No intramural hematoma or aortic dissection.  2.  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  3.  Poorly opacified intrahepatic left hepatic artery from the gastric   artery is also concerning for critical subtotal or complete occlusion.    Spoke with DYLAN TERRELL DO on 7/27/2023 7:25 AM.    --- End of Report ---          SUHA FERNANDEZ MD; Resident Radiologist  This document has been electronically signed.  MEERA LOVE MD; Attending Radiologist  This document has been electronically signed. Jul 27 2023  7:27AM    < end of copied text >         Chief complaint/Reason for consult: gastric artery thrombosis    HPI:  A 38 y/o male with pmhx of HLD non compliant with medication, GERD s/p EGD 10 yrs ago negative for gastritis,  s/p lap yoselyn 10 yrs ago, ? ventricular thickening had ECHO 10 yrs ago follows up with Dr. Prince presents with chest pain. Pt report woke up at 5 am with back pain, then realized was chest pain. Pt reports chest pain 6/10 initially constant. PT reports pain improved after morphine and nitroglycerine.  Pt denies palpitation. Pt  denies numbness or tingling to the arms. PT denies calf pain. Pt denies headache or blurry vision. Pt reports family hx of DVT grandparent. Pt reports diarrhea for few days, non bloody, 4 episodes yesterday , improved after Pepto-Bismol. PT denies fever, chills, shortness of breath, burning with urination.  (27 Jul 2023 11:05)    GI updates: 37yMale pmh HLD, lap choley 9 years ago presents for chest pain 7/10. Patient reports on and off diarrhea for one week, 4-5 episodes yesterday and one episode today. Currently Patient denies nausea, vomiting, hematemesis, melena, blood in stool,  constipation, abdominal pain.      PAST MEDICAL & SURGICAL HISTORY:   No pertinent past medical history      HLD (hyperlipidemia)      S/P cholecystectomy            Family history:  FAMILY HISTORY:    No GI cancers in first or second degree relatives    Social History: No smoking. No alcohol. No illegal drug use.    Allergies:   Nuts (Unknown)  No Known Drug Allergies      MEDICATIONS: Home Medications:  simvastatin 10 mg oral tablet: 0.5 orally once a day (27 Jul 2023 11:28)    MEDICATIONS  (STANDING):  chlorhexidine 2% Cloths 1 Application(s) Topical <User Schedule>  heparin   Injectable 8500 Unit(s) IV Push once  heparin  Infusion.  Unit(s)/Hr (18 mL/Hr) IV Continuous <Continuous>  pantoprazole    Tablet 40 milliGRAM(s) Oral before breakfast  simvastatin 5 milliGRAM(s) Oral at bedtime    MEDICATIONS  (PRN):  acetaminophen     Tablet .. 650 milliGRAM(s) Oral every 6 hours PRN Temp greater or equal to 38C (100.4F), Mild Pain (1 - 3)  heparin   Injectable 8500 Unit(s) IV Push every 6 hours PRN For aPTT less than 40  heparin   Injectable 4000 Unit(s) IV Push every 6 hours PRN For aPTT between 40 - 57        REVIEW OF SYSTEMS  General:  No weight loss, fevers, or chills.  Eyes:  No reported pain or visual changes  ENT:  No sore throat or runny nose.  NECK: No stiffness or lymphadenopathy  CV:  + chest pain no palpitations.  Resp:  No shortness of breath, cough, wheezing or hemoptysis  GI:  No abdominal pain, nausea, vomiting, dysphagia, +diarrhea no constipation. No rectal bleeding, melena, or hematemesis.  Muscle:  No aches or weakness  Neuro:  No tingling, numbness       VITALS:   T(F): 97.5 (07-27-23 @ 05:27), Max: 97.5 (07-27-23 @ 05:27)  HR: 88 (07-27-23 @ 08:24) (74 - 92)  BP: 140/76 (07-27-23 @ 08:24) (140/76 - 182/95)  RR: 18 (07-27-23 @ 08:24) (18 - 18)  SpO2: 97% (07-27-23 @ 08:24) (97% - 100%)    PHYSICAL EXAM:  GENERAL: AAOx3, no acute distress.  HEAD:  Atraumatic, Normocephalic  EYES: conjunctiva and sclera clear  NECK: Supple, No thyromegaly   CHEST/LUNG: Clear to auscultation bilaterally; No wheeze, rhonchi, or rales  HEART: Regular rate and rhythm; normal S1, S2, No murmurs.  ABDOMEN: Soft, nontender, nondistended; Bowel sounds present  NEUROLOGY: No asterixis or tremor  SKIN: Intact, no jaundice          LABS:  07-27    140  |  101  |  9<L>  ----------------------------<  120<H>  4.0   |  26  |  0.9    Ca    8.8      27 Jul 2023 05:55  Mg     2.0     07-27    TPro  7.1  /  Alb  4.6  /  TBili  0.6  /  DBili  x   /  AST  26  /  ALT  45<H>  /  AlkPhos  93  07-27                          16.6   7.10  )-----------( 234      ( 27 Jul 2023 05:55 )             48.8     LIVER FUNCTIONS - ( 27 Jul 2023 05:55 )  Alb: 4.6 g/dL / Pro: 7.1 g/dL / ALK PHOS: 93 U/L / ALT: 45 U/L / AST: 26 U/L / GGT: x           PT/INR - ( 27 Jul 2023 08:00 )   PT: 12.50 sec;   INR: 1.09 ratio         PTT - ( 27 Jul 2023 08:00 )  PTT:36.4 sec    IMAGING:    < from: CT Angio Abdomen and Pelvis w/ IV Cont (07.27.23 @ 06:31) >    ACC: [id number] EXAM:  CT ANGIO ABD PELV (W)AW IC   ORDERED BY: PETER MARTINEZ     ACC: [id number] EXAM:  CT ANGIO CHEST AORTA WAWIC   ORDERED BY: ADEBAYO PRECIADO     PROCEDURE DATE:  07/27/2023          INTERPRETATION:  INDICATION: Evaluation for Aortic Dissection. Chest pain.    TECHNIQUE: CT of the chest, abdomen, and pelvis was performed from the   thoracic inlet to the level of the pubic symphysis before and following   the administration of 95 cc Omnipaque 350 intravenous contrast.. Oral   contrast was not administered. Coronal and sagittal images have been   submitted, as well as MIP reformats.    COMPARISON: CT heart 3/6/2018    FINDINGS:    CT CHEST:    VASCULAR: No intramural hematoma or aortic dissection. Critical   stenosis/nonocclusive thrombus of the proximal left gastric artery with   distal reconstitution (series 605, image 74 and series 604, image 51).   Replaced left hepatic artery (arising from the left gastric artery, an   anatomic variant) is nonopacified and also concerning for thrombus.    LUNGS/PLEURA/AIRWAYS: Patent central tracheobronchial tree. No evidence   of consolidation, pleural effusion, or pneumothorax.    MEDIASTINUM/LYMPH NODES: Visualized thyroid gland is symmetric. No   axillary, hilar, or mediastinal lymphadenopathy.    HEART/GREAT VESSELS: Normal heart size. No pericardial effusion. Normal   caliber main pulmonary artery. Normal caliber thoracic aorta.      CT ABDOMEN/PELVIS:    HEPATOBILIARY: Right hepatic lobe flash filling hemangioma.   Post-cholecystectomy.    SPLEEN: Unremarkable.    PANCREAS: Unremarkable.    ADRENAL GLANDS: Unremarkable.    KIDNEYS: Symmetric renal enhancement. No hydronephrosis.    ABDOMINOPELVIC NODES: Unremarkable.    PELVIC ORGANS: Unremarkable.    PERITONEUM/MESENTERY/BOWEL: No evidence of bowel obstruction,   pneumoperitoneum, or ascites. Unremarkable appendix.    BONES AND SOFT TISSUES: Unremarkable.      IMPRESSION:    1.  No intramural hematoma or aortic dissection.  2.  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  3.  Poorly opacified intrahepatic left hepatic artery from the gastric   artery is also concerning for critical subtotal or complete occlusion.    Spoke with DYLAN TERRELL DO on 7/27/2023 7:25 AM.    --- End of Report ---          SUHA FERNANDEZ MD; Resident Radiologist  This document has been electronically signed.  MEERA LOVE MD; Attending Radiologist  This document has been electronically signed. Jul 27 2023  7:27AM    < end of copied text >

## 2023-07-27 NOTE — H&P ADULT - ASSESSMENT
A 38 y/o male with pmhx of HLD non compliant with medication, GERD s/p EGD 10 yrs ago negative for gastritis,  s/p lap yoselyn 10 yrs ago, ? ventricular thickening had ECHO 10 yrs ago follows up with Dr. Prince presents with chest pain.     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   #Poorly opacified intrahepatic left hepatic artery from the gastric   artery is also concerning for critical subtotal or complete occlusion  -cw Lovenox full anticoagulation 100 mg BID   -seen by vascular recs reviewed - will get GI/hepatology, hematology   -cardiology consult  -ECHO w/ bubble   -cardia monitor   -lipid panel   -cw statin   -hypercoagulation panel       #chest pain   -EKG NS with PVC  -trend cardiac enzymes  -cardiac monitor   -ECHO   -cardiology consult     #diarrhea  -resolving  -monitor BM    #HLD  -dash diet  -cw statin       DVT prophylaxis   GI prophylaxis    A 38 y/o male with pmhx of HLD non compliant with medication, GERD s/p EGD 10 yrs ago negative for gastritis,  s/p lap yoselyn 10 yrs ago, ? ventricular thickening had ECHO 10 yrs ago follows up with Dr. Prince presents with chest pain.     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   #Poorly opacified intrahepatic left hepatic artery from the gastric   artery is also concerning for critical subtotal or complete occlusion  -cw Lovenox full x 1 dose in ED, will start on heparin drip per vascular   -seen by vascular recs reviewed - will get GI/hepatology, hematology   -cardiology consult  -ECHO w/ bubble   -cardia monitor   -lipid panel   -cw statin   -hypercoagulation panel       #chest pain   -EKG NS with PVC  -trend cardiac enzymes  -cardiac monitor   -ECHO   -cardiology consult     #diarrhea  -resolving  -monitor BM    #HLD  -dash diet  -cw statin       DVT prophylaxis   GI prophylaxis

## 2023-07-27 NOTE — CONSULT NOTE ADULT - SUBJECTIVE AND OBJECTIVE BOX
Patient is a 37y old  Male who presents with a chief complaint of chest pain, thrombus of gastric artery (27 Jul 2023 12:53)      HPI:  A 36 y/o male with pmhx of HLD non compliant with medication, GERD s/p EGD 10 yrs ago negative for gastritis,  s/p lap yoselyn 10 yrs ago, ? ventricular thickening had ECHO 10 yrs ago follows up with Dr. Prince presents with chest pain. Pt report woke up at 5 am with back pain, then realized was chest pain. Pt reports chest pain 6/10 initially constant. PT reports pain improved after morphine and nitroglycerine.  Pt denies palpitation. Pt  denies numbness or tingling to the arms. PT denies calf pain. Pt denies headache or blurry vision. Pt reports family hx of DVT grandparent. Pt reports diarrhea for few days, non bloody, 4 episodes yesterday , improved after Pepto-Bismol. PT denies fever, chills, shortness of breath, burning with urination.  (27 Jul 2023 11:05)     CT Angio Chest Aorta w/wo IV Cont showed No intramural hematoma or aortic dissection. 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  Poorly opacified intrahepatic left hepatic artery from the gastric artery is also concerning for critical subtotal or complete occlusion.      ROS: as above       PAST MEDICAL & SURGICAL HISTORY:  No pertinent past medical history      HLD (hyperlipidemia)      S/P cholecystectomy          SOCIAL HISTORY:    FAMILY HISTORY:      MEDICATIONS  (STANDING):  chlorhexidine 2% Cloths 1 Application(s) Topical <User Schedule>  heparin  Infusion 1500 Unit(s)/Hr (15 mL/Hr) IV Continuous <Continuous>  pantoprazole    Tablet 40 milliGRAM(s) Oral before breakfast  simvastatin 5 milliGRAM(s) Oral at bedtime    MEDICATIONS  (PRN):  acetaminophen     Tablet .. 650 milliGRAM(s) Oral every 6 hours PRN Temp greater or equal to 38C (100.4F), Mild Pain (1 - 3)  heparin   Injectable 8500 Unit(s) IV Push every 6 hours PRN For aPTT less than 40  heparin   Injectable 4000 Unit(s) IV Push every 6 hours PRN For aPTT between 40 - 57  morphine  - Injectable 4 milliGRAM(s) IV Push every 6 hours PRN Severe Pain (7 - 10)      Allergies    Nuts (Unknown)  No Known Drug Allergies    Intolerances        Vital Signs Last 24 Hrs  T(C): 36.1 (27 Jul 2023 20:47), Max: 36.4 (27 Jul 2023 05:27)  T(F): 97 (27 Jul 2023 20:47), Max: 97.5 (27 Jul 2023 05:27)  HR: 91 (27 Jul 2023 20:47) (60 - 92)  BP: 146/93 (27 Jul 2023 20:47) (125/58 - 182/95)  BP(mean): --  RR: 18 (27 Jul 2023 20:47) (16 - 18)  SpO2: 98% (27 Jul 2023 20:47) (97% - 100%)    Parameters below as of 27 Jul 2023 08:24  Patient On (Oxygen Delivery Method): room air        PHYSICAL EXAM  General: adult in NAD  HEENT: clear oropharynx, anicteric sclera.  Neck: supple  CV: normal S1/S2 RR  Lungs: CTA b/l  Abdomen: soft non-tender non-distended, no hepatosplenomegaly  Ext: no clubbing cyanosis or edema  Skin: no rashes and no petechiae  Neuro: alert and oriented X 4, no focal deficits      LABS:                          16.6   7.10  )-----------( 234      ( 27 Jul 2023 05:55 )             48.8         Mean Cell Volume : 85.9 fL  Mean Cell Hemoglobin : 29.2 pg  Mean Cell Hemoglobin Concentration : 34.0 g/dL  Auto Neutrophil # : 3.50 K/uL  Auto Lymphocyte # : 2.68 K/uL  Auto Monocyte # : 0.64 K/uL  Auto Eosinophil # : 0.24 K/uL  Auto Basophil # : 0.02 K/uL  Auto Neutrophil % : 49.3 %  Auto Lymphocyte % : 37.7 %  Auto Monocyte % : 9.0 %  Auto Eosinophil % : 3.4 %  Auto Basophil % : 0.3 %      Serial CBC's  07-27 @ 05:55  Hct-48.8 / Hgb-16.6 / Plat-234 / RBC-5.68 / WBC-7.10      07-27    140  |  101  |  9<L>  ----------------------------<  120<H>  4.0   |  26  |  0.9    Ca    8.8      27 Jul 2023 05:55  Mg     2.0     07-27    TPro  7.1  /  Alb  4.6  /  TBili  0.6  /  DBili  x   /  AST  26  /  ALT  45<H>  /  AlkPhos  93  07-27      PT/INR - ( 27 Jul 2023 20:07 )   PT: 13.20 sec;   INR: 1.15 ratio         PTT - ( 27 Jul 2023 20:07 )  PTT:142.2 sec                RADIOLOGY & ADDITIONAL STUDIES:

## 2023-07-27 NOTE — ED PROVIDER NOTE - NS_EDPROVIDERDISPOUSERTYPE_ED_A_ED
Attending Attestation (For Attendings USE Only)... Birth Control Pills Counseling: Birth Control Pill Counseling: I discussed with the patient the potential side effects of OCPs including but not limited to increased risk of stroke, heart attack, thrombophlebitis, deep venous thrombosis, hepatic adenomas, breast changes, GI upset, headaches, and depression.  The patient verbalized understanding of the proper use and possible adverse effects of OCPs. All of the patient's questions and concerns were addressed.

## 2023-07-27 NOTE — H&P ADULT - NSHPLABSRESULTS_GEN_ALL_CORE
16.6   7.10  )-----------( 234      ( 27 Jul 2023 05:55 )             48.8       07-27    140  |  101  |  9<L>  ----------------------------<  120<H>  4.0   |  26  |  0.9    Ca    8.8      27 Jul 2023 05:55  Mg     2.0     07-27    TPro  7.1  /  Alb  4.6  /  TBili  0.6  /  DBili  x   /  AST  26  /  ALT  45<H>  /  AlkPhos  93  07-27          Magnesium: 2.0 mg/dL (07-27-23 @ 05:55)          Urinalysis Basic - ( 27 Jul 2023 05:55 )    Color: x / Appearance: x / SG: x / pH: x  Gluc: 120 mg/dL / Ketone: x  / Bili: x / Urobili: x   Blood: x / Protein: x / Nitrite: x   Leuk Esterase: x / RBC: x / WBC x   Sq Epi: x / Non Sq Epi: x / Bacteria: x        PT/INR - ( 27 Jul 2023 08:00 )   PT: 12.50 sec;   INR: 1.09 ratio         PTT - ( 27 Jul 2023 08:00 )  PTT:36.4 sec    Lactate Trend  07-27 @ 08:00 Lactate:1.1       CARDIAC MARKERS ( 27 Jul 2023 08:00 )  x     / <0.01 ng/mL / x     / x     / x      CARDIAC MARKERS ( 27 Jul 2023 05:55 )  x     / <0.01 ng/mL / x     / x     / x        < from: CT Angio Chest Aorta w/wo IV Cont (07.27.23 @ 06:31) >    IMPRESSION:    1.  No intramural hematoma or aortic dissection.  2.  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  3.  Poorly opacified intrahepatic left hepatic artery from the gastric   artery is also concerning for critical subtotal or complete occlusion.    Spoke with DYLAN TERRELL DO on 7/27/2023 7:25 AM.    --- End of Report ---    SUHA FERNANDEZ MD; Resident Radiologist  This document has been electronically signed.  MEERA LOVE MD; Attending Radiologist  This document has been electronically signed. Jul 27 2023  7:27AM    < end of copied text >

## 2023-07-27 NOTE — CONSULT NOTE ADULT - NS ATTEND AMEND GEN_ALL_CORE FT
36 y/o male with pmhx of HLD non compliant with medication, GERD s/p EGD 10 yrs ago negative for gastritis,  s/p lap yoselyn 10 yrs ago, ? ventricular thickening had ECHO 10 yrs ago, presents with chest pain. Patient with now epigastric chest back pain. No mi. Pain appears non cardiac. Risk Gi W/U low. He has FH blood clot grand parents. He has bad dreams. Consider out patient sleep study. Consider outpatient 30 day MCOT. To get cardiac CTA
37 years old male with epigastric pain, with history of lap yoselyn and hyperlipidemia.  I personally reviewed the CTA images- all major arteries from aortic root to femoral arteries are patent with no evidence of thrombus or significant atherosclerotic disease. Celiac trunk, WESLEY and splenic artery are also patent. The left gastric artery (replaced left hepatic?) has thrombus in it.    It is highly unlikely that this finding is embolic (the course of the artery and its size), even if so, the source is not proximal artrial tree.  Echocardiogram for visualization of the anatomy and possible PFO will rule out embolic sources.  Would recommend hypercoagulable work up and GI/hepatology input (is there any local pathology in the area of concern which can explain the findings?)  If there is no explanation from hematology and GI point, and echo is normal, he needs to stay on heparin, and transition to NOAC (please complete the hematology work up prior to starting NOAC, to be able to have labs)  The only imaging useful for FU will be CTA (in 3 months)
Patient with thrombus in Left Gastric artery. No GI symptoms? mild upper abdominal pain. Case D/w Vascular, Hematology and Hospitalist. Patient has no lactic acidosis, no leukocytosis indicating any ischemia however may benefit from diagnostic EGD after cardiac clearance.

## 2023-07-27 NOTE — ED PROVIDER NOTE - ATTENDING APP SHARED VISIT CONTRIBUTION OF CARE
37yM   pmxh HLD  non smoker  pw chest pain since  5am ,   and  back  pain.  no dizziness  paresthesias numbness of extremities,  no syncope.   independent interpretation of EKG  -  nsr no stemi no acute ischemia + PVC  CAT ro dissection

## 2023-07-27 NOTE — CONSULT NOTE ADULT - ASSESSMENT
37yMale [facility] HLD, lap patrick 9 years ago presents for chest pain 7/10. 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  Poorly opacified intrahepatic left hepatic artery from the gastric   artery is also concerning for critical subtotal or complete occlusion  Rec  -hematology consult  -appreciate vascular team  -continue heparin  37yMale [facility] HLD, dominique nguyen 9 years ago presents for chest pain 7/10. 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  Poorly opacified intrahepatic left hepatic artery from the gastric   artery is also concerning for critical subtotal or complete occlusion  Rec  -hematology consult  -to consider diagnostic EGD on heparin tomorrow after cardiac clearance to r/o developing ischemia   -keep patient NPO  -appreciate vascular team  -continue heparin  -case discussed with Dr. Valle of primary team, Dr. Reddy of hematology, and Dr. Tirado of Vascular

## 2023-07-28 LAB
ALBUMIN SERPL ELPH-MCNC: 4.4 G/DL — SIGNIFICANT CHANGE UP (ref 3.5–5.2)
ALP SERPL-CCNC: 109 U/L — SIGNIFICANT CHANGE UP (ref 30–115)
ALT FLD-CCNC: 61 U/L — HIGH (ref 0–41)
ANION GAP SERPL CALC-SCNC: 11 MMOL/L — SIGNIFICANT CHANGE UP (ref 7–14)
APCR PPP: 3.45 RATIO — SIGNIFICANT CHANGE UP
APTT BLD: 48.4 SEC — HIGH (ref 27–39.2)
APTT BLD: 72.5 SEC — CRITICAL HIGH (ref 27–39.2)
APTT BLD: 92.3 SEC — CRITICAL HIGH (ref 27–39.2)
AST SERPL-CCNC: 37 U/L — SIGNIFICANT CHANGE UP (ref 0–41)
AT III ACT/NOR PPP CHRO: 84 % — LOW (ref 85–135)
B2 GLYCOPROT1 AB SER QL: NEGATIVE — SIGNIFICANT CHANGE UP
BILIRUB SERPL-MCNC: 0.7 MG/DL — SIGNIFICANT CHANGE UP (ref 0.2–1.2)
BUN SERPL-MCNC: 8 MG/DL — LOW (ref 10–20)
CALCIUM SERPL-MCNC: 9 MG/DL — SIGNIFICANT CHANGE UP (ref 8.4–10.5)
CARDIOLIPIN AB SER-ACNC: NEGATIVE — SIGNIFICANT CHANGE UP
CHLORIDE SERPL-SCNC: 101 MMOL/L — SIGNIFICANT CHANGE UP (ref 98–110)
CK MB CFR SERPL CALC: <1 NG/ML — SIGNIFICANT CHANGE UP (ref 0.6–6.3)
CK SERPL-CCNC: 70 U/L — SIGNIFICANT CHANGE UP (ref 0–225)
CO2 SERPL-SCNC: 28 MMOL/L — SIGNIFICANT CHANGE UP (ref 17–32)
CREAT SERPL-MCNC: 0.9 MG/DL — SIGNIFICANT CHANGE UP (ref 0.7–1.5)
EGFR: 113 ML/MIN/1.73M2 — SIGNIFICANT CHANGE UP
GLUCOSE SERPL-MCNC: 99 MG/DL — SIGNIFICANT CHANGE UP (ref 70–99)
HCT VFR BLD CALC: 46.1 % — SIGNIFICANT CHANGE UP (ref 42–52)
HCYS SERPL-MCNC: 9.2 UMOL/L — SIGNIFICANT CHANGE UP
HGB BLD-MCNC: 15.8 G/DL — SIGNIFICANT CHANGE UP (ref 14–18)
INR BLD: 1.1 RATIO — SIGNIFICANT CHANGE UP (ref 0.65–1.3)
MCHC RBC-ENTMCNC: 29.5 PG — SIGNIFICANT CHANGE UP (ref 27–31)
MCHC RBC-ENTMCNC: 34.3 G/DL — SIGNIFICANT CHANGE UP (ref 32–37)
MCV RBC AUTO: 86.2 FL — SIGNIFICANT CHANGE UP (ref 80–94)
NRBC # BLD: 0 /100 WBCS — SIGNIFICANT CHANGE UP (ref 0–0)
PLATELET # BLD AUTO: 233 K/UL — SIGNIFICANT CHANGE UP (ref 130–400)
PMV BLD: 8.7 FL — SIGNIFICANT CHANGE UP (ref 7.4–10.4)
POTASSIUM SERPL-MCNC: 4.4 MMOL/L — SIGNIFICANT CHANGE UP (ref 3.5–5)
POTASSIUM SERPL-SCNC: 4.4 MMOL/L — SIGNIFICANT CHANGE UP (ref 3.5–5)
PROT SERPL-MCNC: 6.8 G/DL — SIGNIFICANT CHANGE UP (ref 6–8)
PROTHROM AB SERPL-ACNC: 12.6 SEC — SIGNIFICANT CHANGE UP (ref 9.95–12.87)
RBC # BLD: 5.35 M/UL — SIGNIFICANT CHANGE UP (ref 4.7–6.1)
RBC # FLD: 12.5 % — SIGNIFICANT CHANGE UP (ref 11.5–14.5)
SODIUM SERPL-SCNC: 140 MMOL/L — SIGNIFICANT CHANGE UP (ref 135–146)
TROPONIN T SERPL-MCNC: <0.01 NG/ML — SIGNIFICANT CHANGE UP
WBC # BLD: 7.61 K/UL — SIGNIFICANT CHANGE UP (ref 4.8–10.8)
WBC # FLD AUTO: 7.61 K/UL — SIGNIFICANT CHANGE UP (ref 4.8–10.8)

## 2023-07-28 PROCEDURE — 93010 ELECTROCARDIOGRAM REPORT: CPT

## 2023-07-28 PROCEDURE — 99222 1ST HOSP IP/OBS MODERATE 55: CPT

## 2023-07-28 PROCEDURE — 99233 SBSQ HOSP IP/OBS HIGH 50: CPT

## 2023-07-28 RX ORDER — METOPROLOL TARTRATE 50 MG
100 TABLET ORAL ONCE
Refills: 0 | Status: COMPLETED | OUTPATIENT
Start: 2023-07-28 | End: 2023-07-28

## 2023-07-28 RX ORDER — APIXABAN 2.5 MG/1
5 TABLET, FILM COATED ORAL EVERY 12 HOURS
Refills: 0 | Status: DISCONTINUED | OUTPATIENT
Start: 2023-07-28 | End: 2023-07-29

## 2023-07-28 RX ORDER — METHOCARBAMOL 500 MG/1
500 TABLET, FILM COATED ORAL DAILY
Refills: 0 | Status: DISCONTINUED | OUTPATIENT
Start: 2023-07-28 | End: 2023-07-29

## 2023-07-28 RX ADMIN — HEPARIN SODIUM 15 UNIT(S)/HR: 5000 INJECTION INTRAVENOUS; SUBCUTANEOUS at 05:16

## 2023-07-28 RX ADMIN — SIMVASTATIN 5 MILLIGRAM(S): 20 TABLET, FILM COATED ORAL at 21:10

## 2023-07-28 RX ADMIN — Medication 650 MILLIGRAM(S): at 14:57

## 2023-07-28 RX ADMIN — PANTOPRAZOLE SODIUM 40 MILLIGRAM(S): 20 TABLET, DELAYED RELEASE ORAL at 05:29

## 2023-07-28 RX ADMIN — APIXABAN 5 MILLIGRAM(S): 2.5 TABLET, FILM COATED ORAL at 18:38

## 2023-07-28 RX ADMIN — METHOCARBAMOL 500 MILLIGRAM(S): 500 TABLET, FILM COATED ORAL at 18:39

## 2023-07-28 RX ADMIN — Medication 650 MILLIGRAM(S): at 08:07

## 2023-07-28 NOTE — PROGRESS NOTE ADULT - ASSESSMENT
37yMale [facility] HLD, dominique nguyen 9 years ago presents for chest pain 7/10. 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  Poorly opacified intrahepatic left hepatic artery from the gastric   artery is also concerning for critical subtotal or complete occlusion  Rec  -hematology consult appreciated   -patient refused EGD, risks and benefits discussed   -keep patient NPO  -AC as per vascular   -case discussed with Dr. Valle of primary team, Dr. Reddy of hematology, and Dr. Tirado of Vascular, and radiology team  - Follow up with our GI office located on [address] [place] [address], Phone number [phone] with Dr. Bansal

## 2023-07-28 NOTE — PROGRESS NOTE ADULT - SUBJECTIVE AND OBJECTIVE BOX
37yMale  Being followed for gastric and hepatic thrombosis   Interval history: Patient denies nausea, vomiting, hematemesis, melena, blood in stool, diarrhea, constipation, abdominal pain. Patient eating without pain, wants to hold off on EGD.      PAST MEDICAL & SURGICAL HISTORY:   No pertinent past medical history      HLD (hyperlipidemia)      S/P cholecystectomy                Social History: No smoking. No alcohol. No illegal drug use.            MEDICATIONS  (STANDING):  chlorhexidine 2% Cloths 1 Application(s) Topical <User Schedule>  heparin  Infusion 1500 Unit(s)/Hr (15 mL/Hr) IV Continuous <Continuous>  pantoprazole    Tablet 40 milliGRAM(s) Oral before breakfast  simvastatin 5 milliGRAM(s) Oral at bedtime    MEDICATIONS  (PRN):  acetaminophen     Tablet .. 650 milliGRAM(s) Oral every 6 hours PRN Temp greater or equal to 38C (100.4F), Mild Pain (1 - 3)  heparin   Injectable 8500 Unit(s) IV Push every 6 hours PRN For aPTT less than 40  heparin   Injectable 4000 Unit(s) IV Push every 6 hours PRN For aPTT between 40 - 57  morphine  - Injectable 4 milliGRAM(s) IV Push every 6 hours PRN Severe Pain (7 - 10)      Allergies:  Nuts (Unknown)  No Known Drug Allergies  Intolerances          REVIEW OF SYSTEMS:  General:  No weight loss, fevers, or chills.  Eyes:  No reported pain or visual changes  ENT:  No sore throat or runny nose.  NECK: No stiffness   CV:  No chest pain or palpitations.  Resp:  No shortness of breath, cough  GI:  No abdominal pain, nausea, vomiting, dysphagia, diarrhea or constipation. No rectal bleeding, melena, or hematemesis.  Muscle:  No aches or weakness  Neuro:  No tingling, numbness   Heme:  No ecchymosis or easy bruisability        VITAL SIGNS:   T(F): 98 (07-28-23 @ 14:05), Max: 98.1 (07-28-23 @ 05:01)  HR: 73 (07-28-23 @ 14:05) (60 - 93)  BP: 118/74 (07-28-23 @ 14:05) (103/60 - 147/102)  RR: 20 (07-28-23 @ 05:01) (16 - 20)  SpO2: 97% (07-28-23 @ 05:01) (97% - 98%)    PHYSICAL EXAM:  GENERAL: AAOx3, no acute distress.  HEAD:  Atraumatic, Normocephalic  EYES: conjunctiva and sclera clear  NECK: Supple, no JVD or thyromegaly  CHEST/LUNG: Clear to auscultation bilaterally; No wheeze, rhonchi, or rales  HEART: Regular rate and rhythm; normal S1, S2, No murmurs.  ABDOMEN: Soft, nontender, nondistended; Bowel sounds present  NEUROLOGY: No asterixis or tremor.   SKIN: Intact, no jaundice            LABS:                        15.8   7.61  )-----------( 233      ( 28 Jul 2023 06:31 )             46.1     07-28    140  |  101  |  8<L>  ----------------------------<  99  4.4   |  28  |  0.9    Ca    9.0      28 Jul 2023 06:31  Mg     2.0     07-27    TPro  6.8  /  Alb  4.4  /  TBili  0.7  /  DBili  x   /  AST  37  /  ALT  61<H>  /  AlkPhos  109  07-28    LIVER FUNCTIONS - ( 28 Jul 2023 06:31 )  Alb: 4.4 g/dL / Pro: 6.8 g/dL / ALK PHOS: 109 U/L / ALT: 61 U/L / AST: 37 U/L / GGT: x           PT/INR - ( 28 Jul 2023 06:31 )   PT: 12.60 sec;   INR: 1.10 ratio         PTT - ( 28 Jul 2023 11:00 )  PTT:92.3 sec    IMAGING:    < from: CT Angio Abdomen and Pelvis w/ IV Cont (07.27.23 @ 06:31) >    ACC: [id number] EXAM:  CT ANGIO ABD PELV (W)AW IC   ORDERED BY: PETER MARTINEZ     ACC: [id number] EXAM:  CT ANGIO CHEST AORTA WAWIC   ORDERED BY: ADEBAYO PRECIADO     PROCEDURE DATE:  07/27/2023          INTERPRETATION:  INDICATION: Evaluation for Aortic Dissection. Chest pain.    TECHNIQUE: CT of the chest, abdomen, and pelvis was performed from the   thoracic inlet to the level of the pubic symphysis before and following   the administration of 95 cc Omnipaque 350 intravenous contrast.. Oral   contrast was not administered. Coronal and sagittal images have been   submitted, as well as MIP reformats.    COMPARISON: CT heart 3/6/2018    FINDINGS:    CT CHEST:    VASCULAR: No intramural hematoma or aortic dissection. Critical   stenosis/nonocclusive thrombus of the proximal left gastric artery with   distal reconstitution (series 605, image 74 and series 604, image 51).   Replaced left hepatic artery (arising from the left gastric artery, an   anatomic variant) is nonopacified and also concerning for thrombus.    LUNGS/PLEURA/AIRWAYS: Patent central tracheobronchial tree. No evidence   of consolidation, pleural effusion, or pneumothorax.    MEDIASTINUM/LYMPH NODES: Visualized thyroid gland is symmetric. No   axillary, hilar, or mediastinal lymphadenopathy.    HEART/GREAT VESSELS: Normal heart size. No pericardial effusion. Normal   caliber main pulmonary artery. Normal caliber thoracic aorta.      CT ABDOMEN/PELVIS:    HEPATOBILIARY: Right hepatic lobe flash filling hemangioma.   Post-cholecystectomy.    SPLEEN: Unremarkable.    PANCREAS: Unremarkable.    ADRENAL GLANDS: Unremarkable.    KIDNEYS: Symmetric renal enhancement. No hydronephrosis.    ABDOMINOPELVIC NODES: Unremarkable.    PELVIC ORGANS: Unremarkable.    PERITONEUM/MESENTERY/BOWEL: No evidence of bowel obstruction,   pneumoperitoneum, or ascites. Unremarkable appendix.    BONES AND SOFT TISSUES: Unremarkable.      IMPRESSION:    1.  No intramural hematoma or aortic dissection.  2.  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  3.  Poorly opacified intrahepatic left hepatic artery from the gastric   artery is also concerning for critical subtotal or complete occlusion.    Spoke with DYLAN TERRELL DO on 7/27/2023 7:25 AM.    --- End of Report ---          SUHA FERNANDEZ MD; Resident Radiologist  This document has been electronically signed.  MEERA LOVE MD; Attending Radiologist  This document has been electronically signed. Jul 27 2023  7:27AM    < end of copied text >        
Progress note    INTERVAL HPI/OVERNIGHT EVENTS:    Patient seen and examined at bedside. He states is chest pain has improved since yesterday.      REVIEW OF SYSTEMS:  All other 13 Review of systems were reviewed and are negative    FAMILY HISTORY:    T(C): 36.7 (07-28-23 @ 14:05), Max: 36.7 (07-28-23 @ 05:01)  HR: 73 (07-28-23 @ 14:05) (62 - 93)  BP: 118/74 (07-28-23 @ 14:05) (103/60 - 146/93)  RR: 20 (07-28-23 @ 05:01) (18 - 20)  SpO2: 97% (07-28-23 @ 05:01) (97% - 98%)  Wt(kg): --Vital Signs Last 24 Hrs  T(C): 36.7 (28 Jul 2023 14:05), Max: 36.7 (28 Jul 2023 05:01)  T(F): 98 (28 Jul 2023 14:05), Max: 98.1 (28 Jul 2023 05:01)  HR: 73 (28 Jul 2023 14:05) (62 - 93)  BP: 118/74 (28 Jul 2023 14:05) (103/60 - 146/93)  BP(mean): --  RR: 20 (28 Jul 2023 05:01) (18 - 20)  SpO2: 97% (28 Jul 2023 05:01) (97% - 98%)    Parameters below as of 28 Jul 2023 05:01  Patient On (Oxygen Delivery Method): room air      Nuts (Unknown)  No Known Drug Allergies      PHYSICAL EXAM:    GENERAL: NAD,  HEAD:  Atraumatic, Normocephalic  EYES: EOMI, PERRLA, conjunctiva and sclera clear  ENT: Moist mucous membranes  NECK: Supple, No JVD  CHEST/LUNG: no tenderness to palpation, Clear to auscultation bilaterally; No rales, rhonchi, wheezing, or rubs. Unlabored respirations, sat 98 % RA  HEART: Regular rate and rhythm; No murmurs, rubs, or gallops  ABDOMEN:  Soft, Nontender, Nondistended. No hepatomegaly  EXTREMITIES: no edema or tenderness   NERVOUS SYSTEM:  Alert & Oriented X3, speech clear. No deficits   MSK: FROM all 4 extremities, full and equal strength  SKIN: No rashes or lesions      Consultant(s) Notes Reviewed:  [x ] YES  [ ] NO  Care Discussed with Consultants/Other Providers [ x] YES  [ ] NO    LABS:      RADIOLOGY & ADDITIONAL TESTS:    Imaging Personally Reviewed:  [ ] YES  [ ] NO  acetaminophen     Tablet .. 650 milliGRAM(s) Oral every 6 hours PRN  chlorhexidine 2% Cloths 1 Application(s) Topical <User Schedule>  heparin   Injectable 8500 Unit(s) IV Push every 6 hours PRN  heparin   Injectable 4000 Unit(s) IV Push every 6 hours PRN  heparin  Infusion 1500 Unit(s)/Hr IV Continuous <Continuous>  morphine  - Injectable 4 milliGRAM(s) IV Push every 6 hours PRN  pantoprazole    Tablet 40 milliGRAM(s) Oral before breakfast  simvastatin 5 milliGRAM(s) Oral at bedtime      HEALTH ISSUES - PROBLEM Dx:    A 36 y/o male with pmhx of HLD non compliant with medication, GERD s/p EGD 10 yrs ago negative for gastritis,  s/p lap yoselyn 10 yrs ago, ? ventricular thickening had ECHO 10 yrs ago follows up with Dr. Prince presents with chest pain.     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  #Poorly opacified intrahepatic left hepatic artery from the gastric artery is also concerning for critical subtotal or complete occlusion  -Dc heparin infusion, start eliquis tonight, C/w statin  -Vascular consult recs apreciated - will need NOAC  -cardiology consult recs appreciated  -ECHO w/ bubble shows intact intra-atrial septum  - Hematology/oncology recs appreciated - hypercoagulation panel sent  -Patient refused CCTA and EGD today. Multiple conversations throughout the day regarding testing, procedure and risks and benefits discussed for both studies, d/w his PCP and would like to follow up as outpatient    #chest pain like 2/2 above vs GERD  -EKG NS with PVC  -Troponin (-) x3  -cardiac monitor   -ECHO unremarkable  -cardiology consult recs appreciated    #diarrhea  -resolving  -monitor BM    #HLD  -dash diet  -cw statin     Dispo: FU will be CTA (in 3 months), Protein C activity, Protein S Ag and activity, ATII when off AC  DVT prophylaxis   GI prophylaxis     Total time spent to complete patient's bedside assessment, review medical chart, discuss medical plan of care with covering medical team was ___55_____ with > 50% of time spent face to face w/ patient, discussion with patient/family and/or coordination of care

## 2023-07-28 NOTE — PROGRESS NOTE ADULT - NS ATTEND AMEND GEN_ALL_CORE FT
The patient had pizza yesterday, and 2 full meals today. Denies any abdominal pain for >24hours. Currently on heparin. Discussed with the patient and wife the concern raised on the initial GI consult which is ischemia to the organs supplied by the occluded vessels (left gastric a. & left hepatic a.). Patient states would rather follow up outpatient as recommended by his PMD. Patient instructed to have a low threshold to return to the ED (pain, hematemesis, fever, nausea vomiting). My recommendation was to leave after the EGD. Continue AC and work up for thrombophilia by hematology and a rheumatology evaluation on OP basis.

## 2023-07-29 ENCOUNTER — TRANSCRIPTION ENCOUNTER (OUTPATIENT)
Age: 38
End: 2023-07-29

## 2023-07-29 VITALS
OXYGEN SATURATION: 97 % | SYSTOLIC BLOOD PRESSURE: 129 MMHG | RESPIRATION RATE: 18 BRPM | DIASTOLIC BLOOD PRESSURE: 83 MMHG | HEART RATE: 81 BPM | TEMPERATURE: 97 F

## 2023-07-29 LAB
ANION GAP SERPL CALC-SCNC: 10 MMOL/L — SIGNIFICANT CHANGE UP (ref 7–14)
BUN SERPL-MCNC: 7 MG/DL — LOW (ref 10–20)
CALCIUM SERPL-MCNC: 9.1 MG/DL — SIGNIFICANT CHANGE UP (ref 8.4–10.5)
CHLORIDE SERPL-SCNC: 101 MMOL/L — SIGNIFICANT CHANGE UP (ref 98–110)
CO2 SERPL-SCNC: 30 MMOL/L — SIGNIFICANT CHANGE UP (ref 17–32)
CREAT SERPL-MCNC: 0.8 MG/DL — SIGNIFICANT CHANGE UP (ref 0.7–1.5)
EGFR: 117 ML/MIN/1.73M2 — SIGNIFICANT CHANGE UP
GLUCOSE SERPL-MCNC: 89 MG/DL — SIGNIFICANT CHANGE UP (ref 70–99)
HCT VFR BLD CALC: 46.3 % — SIGNIFICANT CHANGE UP (ref 42–52)
HGB BLD-MCNC: 16 G/DL — SIGNIFICANT CHANGE UP (ref 14–18)
MCHC RBC-ENTMCNC: 29.5 PG — SIGNIFICANT CHANGE UP (ref 27–31)
MCHC RBC-ENTMCNC: 34.6 G/DL — SIGNIFICANT CHANGE UP (ref 32–37)
MCV RBC AUTO: 85.4 FL — SIGNIFICANT CHANGE UP (ref 80–94)
NRBC # BLD: 0 /100 WBCS — SIGNIFICANT CHANGE UP (ref 0–0)
PLATELET # BLD AUTO: 251 K/UL — SIGNIFICANT CHANGE UP (ref 130–400)
PMV BLD: 9 FL — SIGNIFICANT CHANGE UP (ref 7.4–10.4)
POTASSIUM SERPL-MCNC: 4.1 MMOL/L — SIGNIFICANT CHANGE UP (ref 3.5–5)
POTASSIUM SERPL-SCNC: 4.1 MMOL/L — SIGNIFICANT CHANGE UP (ref 3.5–5)
RBC # BLD: 5.42 M/UL — SIGNIFICANT CHANGE UP (ref 4.7–6.1)
RBC # FLD: 12.5 % — SIGNIFICANT CHANGE UP (ref 11.5–14.5)
SODIUM SERPL-SCNC: 141 MMOL/L — SIGNIFICANT CHANGE UP (ref 135–146)
WBC # BLD: 8.66 K/UL — SIGNIFICANT CHANGE UP (ref 4.8–10.8)
WBC # FLD AUTO: 8.66 K/UL — SIGNIFICANT CHANGE UP (ref 4.8–10.8)

## 2023-07-29 PROCEDURE — 99239 HOSP IP/OBS DSCHRG MGMT >30: CPT

## 2023-07-29 RX ORDER — PANTOPRAZOLE SODIUM 20 MG/1
1 TABLET, DELAYED RELEASE ORAL
Qty: 30 | Refills: 0
Start: 2023-07-29 | End: 2023-08-27

## 2023-07-29 RX ORDER — ATORVASTATIN CALCIUM 80 MG/1
1 TABLET, FILM COATED ORAL
Qty: 30 | Refills: 0
Start: 2023-07-29 | End: 2023-08-27

## 2023-07-29 RX ORDER — APIXABAN 2.5 MG/1
1 TABLET, FILM COATED ORAL
Qty: 60 | Refills: 0
Start: 2023-07-29 | End: 2023-08-27

## 2023-07-29 RX ORDER — SIMVASTATIN 20 MG/1
0.5 TABLET, FILM COATED ORAL
Refills: 0 | DISCHARGE

## 2023-07-29 RX ADMIN — APIXABAN 5 MILLIGRAM(S): 2.5 TABLET, FILM COATED ORAL at 05:55

## 2023-07-29 RX ADMIN — METHOCARBAMOL 500 MILLIGRAM(S): 500 TABLET, FILM COATED ORAL at 01:27

## 2023-07-29 NOTE — DISCHARGE NOTE PROVIDER - CARE PROVIDERS DIRECT ADDRESSES
,DirectAddress_Unknown,[email or web address] [email or web address],DirectAddress_Unknown,[email or web address] [email or web address]

## 2023-07-29 NOTE — DISCHARGE NOTE NURSING/CASE MANAGEMENT/SOCIAL WORK - PATIENT PORTAL LINK FT
You can access the FollowMyHealth Patient Portal offered by [facility] by registering at the following website: [email or web address]. By joining [facility]’s FollowMyHealth portal, you will also be able to view your health information using other applications (apps) compatible with our system.

## 2023-07-29 NOTE — DISCHARGE NOTE PROVIDER - ATTENDING DISCHARGE PHYSICAL EXAMINATION:
GENERAL: NAD,  HEAD:  Atraumatic, Normocephalic  EYES: EOMI, PERRLA, conjunctiva and sclera clear  ENT: Moist mucous membranes  NECK: Supple, No JVD  CHEST/LUNG: no tenderness to palpation, Clear to auscultation bilaterally; No rales, rhonchi, wheezing, or rubs. Unlabored respirations, sat 98 % RA  HEART: Regular rate and rhythm; No murmurs, rubs, or gallops  ABDOMEN:  Soft, Nontender, Nondistended. No hepatomegaly  EXTREMITIES: no edema or tenderness   NERVOUS SYSTEM:  Alert & Oriented X3, speech clear. No deficits   MSK: FROM all 4 extremities, full and equal strength  SKIN: No rashes or lesions

## 2023-07-29 NOTE — DISCHARGE NOTE PROVIDER - PROVIDER TOKENS
PROVIDER:[TOKEN:[[id number]]],PROVIDER:[TOKEN:[[id number]]],PROVIDER:[TOKEN:[[id number]]],PROVIDER:[TOKEN:[[id number]]],PROVIDER:[TOKEN:[[id number]]]

## 2023-07-29 NOTE — DISCHARGE NOTE NURSING/CASE MANAGEMENT/SOCIAL WORK - NSDCPEFALRISK_GEN_ALL_CORE
For information on Fall & Injury Prevention, visit: [email or web address] OR  [email or web address] OR  https://www.cdc.gov/steadi/patient.html

## 2023-07-29 NOTE — DISCHARGE NOTE PROVIDER - CARE PROVIDER_API CALL
Jagjit Prince  Internal Medicine  [address] [place] [address]  Phone: [phone]  Fax: [phone]  Follow Up Time:     Keila Charlton)  Hematology; Medical Oncology  [address] [place] [address]  Phone: [phone]  Fax: [phone]  Follow Up Time:     Lillian Brenner  Vascular Surgery  [address] [place] [address], [address]  [place] [address]  Phone: [phone]  Fax: [phone]  Follow Up Time:     Trey Bansal  Gastroenterology  [address] [place] [address]  Phone: [phone]  Fax: [phone]  Follow Up Time:     John Santos  Cardiovascular Disease  [address] [place] [address]  Phone: [phone]  Fax: [phone]  Follow Up Time:

## 2023-07-29 NOTE — DISCHARGE NOTE PROVIDER - HOSPITAL COURSE
A 36 y/o male with pmhx of HLD non compliant with medication, GERD s/p EGD 10 yrs ago negative for gastritis,  s/p lap yoselyn 10 yrs ago, ? ventricular thickening had ECHO 10 yrs ago follows up with Dr. Prince presents with chest pain. Pt report woke up at 5 am with back pain, then realized was chest pain. Pt reports chest pain 6/10 initially constant. PT reports pain improved after morphine and nitroglycerine.  Pt denies palpitation. Pt  denies numbness or tingling to the arms. PT denies calf pain. Pt denies headache or blurry vision. Pt reports family hx of DVT grandparent. Pt reports diarrhea for few days, non bloody, 4 episodes yesterday , improved after Pepto-Bismol. PT denies fever, chills, shortness of breath, burning with urination. In the ER, patient was found to have critical stenosis/nonocclusive thrombus in the L gastric artery w/ short segment distal reconstitution based on CT imaging. Study also showed poorly opacified intrahepatic L hepatic a from the gastric artery which was concerning for critical subtotal or complete occlusion. Vascular surgery was consulted and recommended hypercoagulable w/u which has been sent. Echocardiogram shows no PFO. Vascular surgery recommended NOAC. Hematology oncology was also consulted and recommended a hypercoagulation panel which was sent. GI was consulted and recommended and EGD which patient refused. Cardiology was consulted for EGD clearance and recommended a CCTA to r/o ACS in a low risk patient and patient refused. I had a long conversation with patient regarding risks and benefits of testing and refusal, He states he wants to f/u as outpatient. He was started on a heparin gtt and transitioned to NOAC on discharge.

## 2023-07-29 NOTE — DISCHARGE NOTE PROVIDER - NSDCMRMEDTOKEN_GEN_ALL_CORE_FT
atorvastatin 40 mg oral tablet: 1 tab(s) orally once a day  Eliquis 5 mg oral tablet: 1 tab(s) orally 2 times a day  pantoprazole 40 mg oral delayed release tablet: 1 tab(s) orally once a day

## 2023-07-29 NOTE — DISCHARGE NOTE PROVIDER - NSDCCPCAREPLAN_GEN_ALL_CORE_FT
PRINCIPAL DISCHARGE DIAGNOSIS  Diagnosis: Gastric artery injury  Assessment and Plan of Treatment: While here, you were admitted for chest pain. CT imaging showed critical stenosis/nonocclusive thrombus in the L gastric artery w/ short segment distal reconstitution based on CT imaging. Study also showed poorly opacified intrahepatic L hepatic a from the gastric artery which was concerning for critical subtotal or complete occlusion. Vascular surgery was consulted and recommended hypercoagulable w/u which has been sent. Echocardiogram shows no PFO. Vascular surgery recommended NOAC. Hematology oncology was also consulted and recommended work-up to include anticardiolipin IgG and IgM, beta 2 glycoprotein I IgG and IgM, HALLEY-2 mutation, CD55 and CD59,  Factor V Leiden, Prothrombin gene mutation. You will need to follow up with protein C activity, Protein S Ag and activity, ATII when off anticoagulation. GI was consulted and recommended and EGD which you did not want to proceed with. Cardiology was consulted for EGD clearance and recommended a CCTA to r/o ACS and you also did not wish to proceed with this. We had a long discussion regarding this and you wished to follow up as an outpatient. You will be sent home on [employer]. Please avoid physical contact to the head such as activities such as skiing, boxing and try to avoid falls or any contact to the head to avoid intracranial hemorrhage. PLease follow up with Hematology oncology, Gastroenterology, Vascular surgery and your PCP.      SECONDARY DISCHARGE DIAGNOSES  Diagnosis: Chest pain  Assessment and Plan of Treatment:      PRINCIPAL DISCHARGE DIAGNOSIS  Diagnosis: Gastric artery injury  Assessment and Plan of Treatment: While here, you were admitted for chest pain. CT imaging showed critical stenosis/nonocclusive thrombus in the L gastric artery w/ short segment distal reconstitution based on CT imaging. Study also showed poorly opacified intrahepatic L hepatic a from the gastric artery which was concerning for critical subtotal or complete occlusion. Vascular surgery was consulted and recommended hypercoagulable w/u which has been sent. Echocardiogram shows no PFO. Vascular surgery recommended Anticoagulation. Hematology oncology was also consulted and recommended work-up to include anticardiolipin IgG and IgM, beta 2 glycoprotein I IgG and IgM, HALLEY-2 mutation, CD55 and CD59,  Factor V Leiden, Prothrombin gene mutation. You will need to follow up with protein C activity, Protein S Ag and activity, ATII when off anticoagulation. GI was consulted and recommended and EGD which you did not want to proceed with. Cardiology was consulted for EGD clearance and recommended a CCTA to r/o ACS and you also did not wish to proceed with this. We had a long discussion regarding this and you wished to follow up as an outpatient. You will be sent home on [employer]. Please avoid physical contact to the head such as activities such as skiing, boxing and try to avoid falls or any contact to the head to avoid intracranial hemorrhage. PLease follow up with Hematology oncology, Gastroenterology, Vascular surgery and your PCP. Please follow up with a repeat CTA of the abd to assess improvement while on Anticoagulation      SECONDARY DISCHARGE DIAGNOSES  Diagnosis: Chest pain  Assessment and Plan of Treatment:

## 2023-07-31 LAB
B2 GLYCOPROT1 AB SER QL: NEGATIVE — SIGNIFICANT CHANGE UP
CARDIOLIPIN AB SER-ACNC: NEGATIVE — SIGNIFICANT CHANGE UP
PROT S FREE PPP-ACNC: 86 % — SIGNIFICANT CHANGE UP (ref 63–140)

## 2023-08-01 ENCOUNTER — APPOINTMENT (OUTPATIENT)
Dept: ELECTROPHYSIOLOGY | Facility: CLINIC | Age: 38
End: 2023-08-01
Payer: COMMERCIAL

## 2023-08-01 ENCOUNTER — TRANSCRIPTION ENCOUNTER (OUTPATIENT)
Age: 38
End: 2023-08-01

## 2023-08-01 VITALS
WEIGHT: 226 LBS | DIASTOLIC BLOOD PRESSURE: 75 MMHG | HEIGHT: 72 IN | BODY MASS INDEX: 30.61 KG/M2 | HEART RATE: 77 BPM | SYSTOLIC BLOOD PRESSURE: 115 MMHG

## 2023-08-01 DIAGNOSIS — Z78.9 OTHER SPECIFIED HEALTH STATUS: ICD-10-CM

## 2023-08-01 DIAGNOSIS — I82.90 ACUTE EMBOLISM AND THROMBOSIS OF UNSPECIFIED VEIN: ICD-10-CM

## 2023-08-01 DIAGNOSIS — E78.5 HYPERLIPIDEMIA, UNSPECIFIED: ICD-10-CM

## 2023-08-01 DIAGNOSIS — Z87.19 PERSONAL HISTORY OF OTHER DISEASES OF THE DIGESTIVE SYSTEM: ICD-10-CM

## 2023-08-01 PROBLEM — Z00.00 ENCOUNTER FOR PREVENTIVE HEALTH EXAMINATION: Status: ACTIVE | Noted: 2023-08-01

## 2023-08-01 LAB
COMMENT - PATHOLOGY: SIGNIFICANT CHANGE UP
PNH GRANULOCYTES: 0 % — SIGNIFICANT CHANGE UP (ref 0–0.01)
PNH MONOCYTES: 0 % — SIGNIFICANT CHANGE UP (ref 0–0.05)
PNH RBC-COMPLETE AG LOSS: 0 % — SIGNIFICANT CHANGE UP (ref 0–0.01)
PNH RBC-PARTIAL AG LOSS: 0.02 % — SIGNIFICANT CHANGE UP (ref 0–0.99)

## 2023-08-01 PROCEDURE — 99205 OFFICE O/P NEW HI 60 MIN: CPT

## 2023-08-01 PROCEDURE — 93000 ELECTROCARDIOGRAM COMPLETE: CPT

## 2023-08-01 RX ORDER — APIXABAN 5 MG/1
5 TABLET, FILM COATED ORAL
Refills: 0 | Status: ACTIVE | COMMUNITY

## 2023-08-01 RX ORDER — ATORVASTATIN CALCIUM 80 MG/1
TABLET, FILM COATED ORAL
Refills: 0 | Status: ACTIVE | COMMUNITY

## 2023-08-01 RX ORDER — PANTOPRAZOLE SODIUM 40 MG/1
40 GRANULE, DELAYED RELEASE ORAL
Refills: 0 | Status: ACTIVE | COMMUNITY

## 2023-08-02 LAB
DRVVT RATIO: 1.01 RATIO — SIGNIFICANT CHANGE UP (ref 0–1.21)
DRVVT SCREEN TO CONFIRM RATIO: SIGNIFICANT CHANGE UP
JAK2 P.V617F BLD/T QL: SIGNIFICANT CHANGE UP
NORMALIZED SCT PPP-RTO: 0.8 RATIO — SIGNIFICANT CHANGE UP (ref 0–1.16)
NORMALIZED SCT PPP-RTO: SIGNIFICANT CHANGE UP
PROT C ACT/NOR PPP: 101 % — SIGNIFICANT CHANGE UP (ref 65–129)
PTR INTERPRETATION: SIGNIFICANT CHANGE UP

## 2023-08-04 DIAGNOSIS — R19.7 DIARRHEA, UNSPECIFIED: ICD-10-CM

## 2023-08-04 DIAGNOSIS — S35.299A UNSPECIFIED INJURY OF BRANCHES OF CELIAC AND MESENTERIC ARTERY, INITIAL ENCOUNTER: ICD-10-CM
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
## 2023-08-04 DIAGNOSIS — Z91.018 ALLERGY TO OTHER FOODS: ICD-10-CM

## 2023-08-04 DIAGNOSIS — K21.9 GASTRO-ESOPHAGEAL REFLUX DISEASE WITHOUT ESOPHAGITIS: ICD-10-CM

## 2023-08-04 DIAGNOSIS — Z91.199 PATIENT'S NONCOMPLIANCE WITH OTHER MEDICAL TREATMENT AND REGIMEN DUE TO UNSPECIFIED REASON: ICD-10-CM

## 2023-08-04 DIAGNOSIS — Z53.20 PROCEDURE AND TREATMENT NOT CARRIED OUT BECAUSE OF PATIENT'S DECISION FOR UNSPECIFIED REASONS: ICD-10-CM

## 2023-08-04 DIAGNOSIS — Z90.49 ACQUIRED ABSENCE OF OTHER SPECIFIED PARTS OF DIGESTIVE TRACT: ICD-10-CM

## 2023-08-04 DIAGNOSIS — R07.9 CHEST PAIN, UNSPECIFIED: ICD-10-CM

## 2023-08-04 DIAGNOSIS — I49.3 VENTRICULAR PREMATURE DEPOLARIZATION: ICD-10-CM
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
## 2023-08-04 DIAGNOSIS — I74.8 EMBOLISM AND THROMBOSIS OF OTHER ARTERIES: ICD-10-CM

## 2023-08-04 DIAGNOSIS — E78.5 HYPERLIPIDEMIA, UNSPECIFIED: ICD-10-CM

## 2023-08-24 ENCOUNTER — OUTPATIENT (OUTPATIENT)
Dept: OUTPATIENT SERVICES | Facility: HOSPITAL | Age: 38
LOS: 1 days | Discharge: HOME | End: 2023-08-24
Payer: COMMERCIAL

## 2023-08-24 ENCOUNTER — APPOINTMENT (OUTPATIENT)
Dept: ELECTROPHYSIOLOGY | Facility: HOSPITAL | Age: 38
End: 2023-08-24

## 2023-08-24 DIAGNOSIS — Z90.49 ACQUIRED ABSENCE OF OTHER SPECIFIED PARTS OF DIGESTIVE TRACT: Chronic | ICD-10-CM

## 2023-08-24 DIAGNOSIS — I49.3 VENTRICULAR PREMATURE DEPOLARIZATION: ICD-10-CM

## 2023-08-24 PROCEDURE — 33285 INSJ SUBQ CAR RHYTHM MNTR: CPT

## 2023-08-24 PROCEDURE — C1764: CPT

## 2023-08-24 RX ORDER — CEPHALEXIN 500 MG
500 CAPSULE ORAL ONCE
Refills: 0 | Status: COMPLETED | OUTPATIENT
Start: 2023-08-24 | End: 2023-08-24

## 2023-08-24 RX ADMIN — Medication 500 MILLIGRAM(S): at 10:48

## 2023-08-24 NOTE — END OF VISIT
[Time Spent: ___ minutes] : I have spent [unfilled] minutes of time on the encounter. [FreeTextEntry3] : I, Nat Pina, personally performed the services described in this documentation. All medical record entries made by the scribe/nurse, [email or web address] were at my direction and in my presence. I have reviewed the chart and agree that the record reflects my personal performance and is accurate and complete.

## 2023-08-24 NOTE — ASSESSMENT
[FreeTextEntry1] : # Gastric Artery Thrombus and L Infrahepatic Artery Thrombus - Cont Eliquis 5 mg PO BID. Denies s/sx of bleeding. - Hypercoagulable panel sent - Would like to defer loop implant until after results, but does want monitoring. Will place 3 week MCOT to obtain PVC burden and to assess for occult AF. Pt will be going to [facility] to  MCOT today. - Long term monitoring pending MCOT and hypercoagulable work up. Will tentatively schedule loop for AFTER above work up.   I have also advised the patient to go to the nearest emergency room if he experiences any chest pain, dyspnea, syncope, or has any other compelling symptoms.  Follow up in 1 mo.

## 2023-08-24 NOTE — H&P ADULT - ASSESSMENT
37yMale with h/o thrombus   presents for elective implantation of loop recorder    Plan:  implant the device  Kelfex 500mg x1 periop  observe post procedure  discharge home when recovers  Remove dressing in 2 days  Do not wet site for 48hrs  f/u in the office in 1month

## 2023-08-24 NOTE — CARDIOLOGY SUMMARY
[de-identified] : 8/1/2023 NSR (HR 77 bpm), iRBBB [de-identified] : 7/27/2023  1. LV Ejection Fraction by Lutz's Method with a biplane EF of 71 %.  2. Normal left atrial size.  3. There is no evidence of pericardial effusion.  4. Structurally normal mitral valve, with normal leaflet excursion.  5. Normal trileaflet aortic valve with normal opening.  6. Color flow doppler and intravenous injection of agitated saline demonstrates the presence of an intact intra atrial septum.  7. Endocardial visualization was enhanced with intravenous echo contrast.

## 2023-08-24 NOTE — DISCUSSION/SUMMARY
[EKG obtained to assist in diagnosis and management of assessed problem(s)] : EKG obtained to assist in diagnosis and management of assessed problem(s) [FreeTextEntry1] : I have recommended an event monitor to further evaluate for occult AFib. I educated the patient about the patient symptom activator feature and I have asked him to activate the event monitor whenever he has symptoms.   I explained the procedure in detail including benefits/risks/alternative. Risk involved is less than 1% of bleeding and infection. We also discussed remote monitoring in detail. All questions were answered and the patient would like to proceed with loop implant.

## 2023-08-24 NOTE — H&P ADULT - HISTORY OF PRESENT ILLNESS
36 y/o M with history of hyperlipidemia, non-compliant with medications, GERD s/p EGD (10 years ago, negative for gastritis), admitted to the hospital from 2023-2023 for chest pain that was sub-sternal and epigastric. Patient denied any palpitations. He did report diarrhea for several days that improved with Pepto-Bismol. 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 The study also revealed poorly opacified intrahepatic left hepatic artery from the gastric artery concerning for clinical subtotal or a complete occlusion. Vascular surgery evaluated the patient and recommended hypercoagulable workup. Echo was performed and showed normal EF and no PFO. Patient was started on direct oral anticoagulation with Eliquis. GI was consulted and recommended EGD, which patient refused.  Patient had MCOT for 20days that did not reveal any arrhythmias, PAC, and PVC only  Patient denies any history of palpitations or skipped beats. No prior history of blood clots. Possible hypercoagulable state for his mom. Patient has a desk job. Since discharge, he denies any further chest pain, dyspnea, pre-syncope, or syncope. He has been on Eliquis and denies any issues with bleeding.  ?  Patient now presents recommended ILR for long term cardiac monitoring.   ?  Cardiology Summary          EC2023 NSR (HR 77 bpm), iRBBB    Echo: 2023   1. LV Ejection Fraction by Lutz's Method with a biplane EF of 71 %.   2. Normal left atrial size.   3. There is no evidence of pericardial effusion.   4. Structurally normal mitral valve, with normal leaflet excursion.   5. Normal trileaflet aortic valve with normal opening.   6. Color flow doppler and intravenous injection of agitated saline demonstrates the presence of an intact intra atrial septum.   7. Endocardial visualization was enhanced with intravenous echo contrast.

## 2023-08-24 NOTE — HISTORY OF PRESENT ILLNESS
[FreeTextEntry1] :  Referring: Dr. Prince   36 y/o M with history of hyperlipidemia, non-compliant with medications, GERD s/p EGD (10 years ago, negative for gastritis), admitted to the hospital from 07/27/2023-07/29/2023 for chest pain that was sub-sternal and epigastric. Patient denied any palpitations. He did report diarrhea for several days that improved with Pepto-Bismol. 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 The study also revealed poorly opacified intrahepatic left hepatic artery from the gastric artery concerning for clinical subtotal or a complete occlusion. Vascular surgery evaluated the patient and recommended hypercoagulable workup. Echo was performed and showed normal EF and no PFO. Patient was started on direct oral anticoagulation with Eliquis. GI was consulted and recommended EGD, which patient refused.   Patient presents today for evaluate of left gastric artery thrombus and intrahepatic left hepatic artery subtotal or complete occlusion. Patient denies any history of palpitations or skipped beats. No prior history of blood clots. Possible hypercoagulable state for his mom. Patient has a desk job. Since discharge, he denies any further chest pain, dyspnea, pre-syncope, or syncope. He has been on Eliquis and denies any issues with bleeding.   He is here to discuss evaluation for possible loop recorder.

## 2023-08-24 NOTE — ASU PATIENT PROFILE, ADULT - FALL HARM RISK - UNIVERSAL INTERVENTIONS
Bed in lowest position, wheels locked, appropriate side rails in place/Call bell, personal items and telephone in reach/Instruct patient to call for assistance before getting out of bed or chair/Non-slip footwear when patient is out of bed/[place] to call system/Physically safe environment - no spills, clutter or unnecessary equipment/Purposeful Proactive Rounding/Room/bathroom lighting operational, light cord in reach

## 2023-08-24 NOTE — PHYSICAL EXAM
[Well Developed] : well developed [Well Nourished] : well nourished [No Acute Distress] : no acute distress [Normal Conjunctiva] : normal conjunctiva [Normal Venous Pressure] : normal venous pressure [Normal S1, S2] : normal S1, S2 [No Murmur] : no murmur [Clear Lung Fields] : clear lung fields [Good Air Entry] : good air entry [No Respiratory Distress] : no respiratory distress  [Soft] : abdomen soft [Normal Gait] : normal gait [No Edema] : no edema [No Rash] : no rash [Moves all extremities] : moves all extremities [Normal Speech] : normal speech [Alert and Oriented] : alert and oriented [Normal memory] : normal memory [de-identified] : overweight

## 2023-08-24 NOTE — ADDENDUM
[FreeTextEntry1] : Calli GOODE assisted in documentation on 04/17/2023 acting as a scribe for Dr. Nat Pina.

## 2023-08-24 NOTE — H&P ADULT - NSHPPHYSICALEXAM_GEN_ALL_CORE
For a patient that is not on any blood thinner inhibitors  Patient is scheduled for a left hip aspiration on 3/13/19.  NPO is for  3 hours prior to procedure.  The patient's present medications and allergies with the patient s nurse and the patient is not on any blood thinners.    The patient will be consented for the procedure when they come down to radiology.   Blood Thinner Holds for Joint Injections  No need to hold-     Aspirin, NSAIDs-Ibuprofen, Motrin, Advil, etc. MENARD-2 inhibitors-Celebrex, Vioxx, Bextra. Salsalate    Platelet inhibitors- Clopidogrel/Plavix; Ticlopidine/Ticlid; Dipyridamole/Persantine; Aggrenox; Cilostazol/Pletal; Prasugrel /Effient;  Ticagrelor/Brilinta    Blood thinners    Coumadin (Warfarin)-Hold 3 days, need INR 1.5 or less.     Dabigatrin (Pradaxa) Hold 2 days (4 doses)     Rivaroxaban (Xarelto) Hold 24 hours (1dose)    IV Heparin (therapeutic-infusion) -Hold 4 hours & check PTT  prior to procedure   SC Heparin (Prophylactic-5000 units BID or TID) -Hold 8-12 hours (1 dose)    SC Enoxaparin     Therapeutic-1 mg/kg Q24h or q12h)-hold 24 hrs (2 doses)     Prophylactic-- 30mg BID or 40mg Qday) -hold 12-24 hrs (1 dose)  SC Arixtra-     Therapeutic-5-7.5mg q24h)- Hold 24 hours (1 dose)    Prophylactic-2.5 mg q24h)- hold 24 hours (1 dose)     PHYSICAL EXAM:    GENERAL: In no apparent distress, well nourished, and hydrated.  HEART: Regular rate and rhythm; No murmur; NO rubs, or gallops.  PULMONARY: Clear to auscultation and percussion.  Normal expansion/effort. No rales, wheezing, or rhonchi bilaterally.  ABDOMEN: Soft, Nontender, Nondistended; Bowel sounds present  EXTREMITIES:  Extremities warm, pink, well-perfused, 2+ Peripheral Pulses, No clubbing, cyanosis, or edema  NEUROLOGICAL: alert & oriented x 3, no focal deficits, PERRLA, EOMI

## 2023-08-25 PROBLEM — I49.3 VENTRICULAR PREMATURE DEPOLARIZATION: Chronic | Status: ACTIVE | Noted: 2023-08-24

## 2023-08-25 PROBLEM — Z86.718 PERSONAL HISTORY OF OTHER VENOUS THROMBOSIS AND EMBOLISM: Chronic | Status: ACTIVE | Noted: 2023-08-24

## 2023-08-28 DIAGNOSIS — I48.91 UNSPECIFIED ATRIAL FIBRILLATION: ICD-10-CM

## 2023-09-19 ENCOUNTER — APPOINTMENT (OUTPATIENT)
Dept: ELECTROPHYSIOLOGY | Facility: CLINIC | Age: 38
End: 2023-09-19
Payer: COMMERCIAL

## 2023-09-19 VITALS
HEART RATE: 84 BPM | SYSTOLIC BLOOD PRESSURE: 115 MMHG | DIASTOLIC BLOOD PRESSURE: 90 MMHG | WEIGHT: 218 LBS | BODY MASS INDEX: 29.53 KG/M2 | HEIGHT: 72 IN

## 2023-09-19 DIAGNOSIS — S35.299A: ICD-10-CM

## 2023-09-19 PROCEDURE — 99213 OFFICE O/P EST LOW 20 MIN: CPT

## 2023-09-19 PROCEDURE — 93000 ELECTROCARDIOGRAM COMPLETE: CPT | Mod: 59

## 2023-09-19 PROCEDURE — 93228 REMOTE 30 DAY ECG REV/REPORT: CPT

## 2023-10-18 ENCOUNTER — NON-APPOINTMENT (OUTPATIENT)
Age: 38
End: 2023-10-18

## 2023-10-30 ENCOUNTER — NON-APPOINTMENT (OUTPATIENT)
Age: 38
End: 2023-10-30

## 2023-10-30 ENCOUNTER — APPOINTMENT (OUTPATIENT)
Dept: CARDIOLOGY | Facility: CLINIC | Age: 38
End: 2023-10-30
Payer: COMMERCIAL

## 2023-10-31 PROCEDURE — G2066: CPT

## 2023-10-31 PROCEDURE — 93298 REM INTERROG DEV EVAL SCRMS: CPT

## 2023-12-01 ENCOUNTER — APPOINTMENT (OUTPATIENT)
Dept: CARDIOLOGY | Facility: CLINIC | Age: 38
End: 2023-12-01
Payer: COMMERCIAL

## 2023-12-01 ENCOUNTER — NON-APPOINTMENT (OUTPATIENT)
Age: 38
End: 2023-12-01

## 2023-12-02 PROCEDURE — 93298 REM INTERROG DEV EVAL SCRMS: CPT

## 2023-12-02 PROCEDURE — G2066: CPT

## 2023-12-09 ENCOUNTER — EMERGENCY (EMERGENCY)
Facility: HOSPITAL | Age: 38
LOS: 0 days | Discharge: HOME | End: 2023-12-09
Attending: EMERGENCY MEDICINE
Payer: COMMERCIAL

## 2023-12-09 VITALS
RESPIRATION RATE: 18 BRPM | SYSTOLIC BLOOD PRESSURE: 132 MMHG | OXYGEN SATURATION: 99 % | TEMPERATURE: 98 F | WEIGHT: 214.95 LBS | HEIGHT: 72 IN | DIASTOLIC BLOOD PRESSURE: 79 MMHG | HEART RATE: 92 BPM

## 2023-12-09 DIAGNOSIS — R05.1 ACUTE COUGH: ICD-10-CM

## 2023-12-09 DIAGNOSIS — Z91.018 ALLERGY TO OTHER FOODS: ICD-10-CM

## 2023-12-09 DIAGNOSIS — Z90.49 ACQUIRED ABSENCE OF OTHER SPECIFIED PARTS OF DIGESTIVE TRACT: Chronic | ICD-10-CM

## 2023-12-09 DIAGNOSIS — Z20.822 CONTACT WITH AND (SUSPECTED) EXPOSURE TO COVID-19: ICD-10-CM

## 2023-12-09 LAB
FLUAV AG NPH QL: SIGNIFICANT CHANGE UP
FLUAV AG NPH QL: SIGNIFICANT CHANGE UP
FLUBV AG NPH QL: SIGNIFICANT CHANGE UP
FLUBV AG NPH QL: SIGNIFICANT CHANGE UP
RSV RNA NPH QL NAA+NON-PROBE: SIGNIFICANT CHANGE UP
RSV RNA NPH QL NAA+NON-PROBE: SIGNIFICANT CHANGE UP
SARS-COV-2 RNA SPEC QL NAA+PROBE: SIGNIFICANT CHANGE UP
SARS-COV-2 RNA SPEC QL NAA+PROBE: SIGNIFICANT CHANGE UP

## 2023-12-09 PROCEDURE — 99283 EMERGENCY DEPT VISIT LOW MDM: CPT | Mod: 25

## 2023-12-09 PROCEDURE — 71046 X-RAY EXAM CHEST 2 VIEWS: CPT | Mod: 26

## 2023-12-09 PROCEDURE — 71046 X-RAY EXAM CHEST 2 VIEWS: CPT

## 2023-12-09 PROCEDURE — 0241U: CPT

## 2023-12-09 PROCEDURE — 99284 EMERGENCY DEPT VISIT MOD MDM: CPT

## 2023-12-09 NOTE — ED PROVIDER NOTE - NSFOLLOWUPINSTRUCTIONS_ED_ALL_ED_FT
Follow up with your Primary Care Provider.    Acute Cough    An acute cough can last up to 3 weeks. Common causes of an acute cough include a cold, allergies, or a lung infection.     Seek care immediately if:   -You have trouble breathing or feel short of breath.  -You cough up blood, or you see blood in your mucus.   -You faint or feel weak or dizzy.   -You have chest pain when you cough or take a deep breath.   -You have new wheezing.     Contact your healthcare provider if:   -You have a fever.   -Your cough lasts longer than 4 weeks.   -Your symptoms do not improve with treatment.   -You have questions or concerns about your condition or care.     Treatment:   An acute cough usually goes away on its own. Ask your healthcare provider about medicines you can take to decrease your cough. You may need medicine to stop the cough, decrease swelling in your airways, or help open your airways. Medicine may also be given to help you cough up mucus. If you have an infection caused by bacteria, you may need antibiotics.     Manage your symptoms:   Do not smoke and stay away from others who smoke. Nicotine and other chemicals in cigarettes and cigars can cause lung damage and make your cough worse. Ask your healthcare provider for information if you currently smoke and need help to quit. E-cigarettes or smokeless tobacco still contain nicotine. Talk to your healthcare provider before you use these products.     Drink extra liquids as directed. Liquids will help thin and loosen mucus so you can cough it up. Liquids will also help prevent dehydration. Examples of good liquids to drink include water, fruit juice, and broth. Do not drink liquids that contain caffeine. Caffeine can increase your risk for dehydration. Ask your healthcare provider how much liquid to drink each day.     Rest as directed. Do not do activities that make your cough worse, such as exercise.     Use a humidifier or vaporizer. Use a cool mist humidifier or a vaporizer to increase air moisture in your home. This may make it easier for you to breathe and help decrease your cough.     Eat 2 to 5 mL of honey 2 times each day with tea. Honey and tea can help thin mucus and decrease your cough.     Use cough drops or lozenges. These can help decrease throat irritation and your cough.     Follow up with your healthcare provider as directed: Write down your questions so you remember to ask them during your visits.

## 2023-12-09 NOTE — ED PROVIDER NOTE - PATIENT PORTAL LINK FT
You can access the FollowMyHealth Patient Portal offered by [facility] by registering at the following website: [email or web address]. By joining [employer]’s FollowMyHealth portal, you will also be able to view your health information using other applications (apps) compatible with our system. You can access the FollowMyHealth Patient Portal offered by [facility] by registering at the following website: [email or web address]. By joining [email or web address]’s FollowMyHealth portal, you will also be able to view your health information using other applications (apps) compatible with our system.

## 2023-12-09 NOTE — ED PROVIDER NOTE - CLINICAL SUMMARY MEDICAL DECISION MAKING FREE TEXT BOX
Patient with persistent cough, no fever, no dyspnea, exam unremarkable, imaging appreciated, will discharge to continue supportive care and follow-up with PMD.  Patient counseled regarding conditions which should prompt return.

## 2023-12-09 NOTE — ED PROVIDER NOTE - ADDITIONAL NOTES AND INSTRUCTIONS:
Mr Lynn was seen in the Emergency Department on 12/9/23 and can return to school or work by the listed date with activity as tolerated.

## 2023-12-09 NOTE — ED PROVIDER NOTE - PHYSICAL EXAMINATION
As Follows:  CONST: Well appearing in NAD  EYES: PERRL, EOMI, Sclera and conjunctiva clear.   ENT: No nasal discharge. TM's clear B/L without drainage. Oropharynx normal appearing, no erythema or exudates. Uvula midline. Airway intact.   CARD: No murmurs, rubs, or gallops; Normal rate and rhythm  RESP: BS Equal B/L, No wheezes, rhonchi or rales. No distress or accessory breathing  GI: Soft, non-tender, non-distended.  SKIN: Warm, dry, no acute rashes. MMM  NEURO: A&Ox3, No focal deficits. Strength and sensation intact. Steady gait

## 2023-12-09 NOTE — ED ADULT NURSE NOTE - NSFALLUNIVINTERV_ED_ALL_ED
Bed/Stretcher in lowest position, wheels locked, appropriate side rails in place/Call bell, personal items and telephone in reach/Instruct patient to call for assistance before getting out of bed/chair/stretcher/Non-slip footwear applied when patient is off stretcher/[place] to call system/Physically safe environment - no spills, clutter or unnecessary equipment/Purposeful proactive rounding/Room/bathroom lighting operational, light cord in reach Bed/Stretcher in lowest position, wheels locked, appropriate side rails in place/Call bell, personal items and telephone in reach/Instruct patient to call for assistance before getting out of bed/chair/stretcher/Non-slip footwear applied when patient is off stretcher/[place] to call system/Physically safe environment - no spills, clutter or unnecessary equipment/Purposeful proactive rounding/Room/bathroom lighting operational, light cord in reach

## 2023-12-09 NOTE — ED ADULT NURSE NOTE - NSICDXPASTMEDICALHX_GEN_ALL_CORE_FT
PAST MEDICAL HISTORY:  History of blood clots     HLD (hyperlipidemia)     No pertinent past medical history     PVC (premature ventricular contraction)

## 2023-12-09 NOTE — ED PROVIDER NOTE - OBJECTIVE STATEMENT
Patient is a 38 year old male presenting for eval of productive cough for 1 week. patient saw his PCP Dr Prince and is on a dexamethasone course which he is still taking. He admits to positive sick contacts including his wife and daughter. He denies any fever, chills, sore throat, N/V, ear pain, abdominal pain, chest pain, shortness of breath, or urinary complaints.

## 2024-01-05 ENCOUNTER — APPOINTMENT (OUTPATIENT)
Dept: CARDIOLOGY | Facility: CLINIC | Age: 39
End: 2024-01-05
Payer: COMMERCIAL

## 2024-01-05 ENCOUNTER — NON-APPOINTMENT (OUTPATIENT)
Age: 39
End: 2024-01-05

## 2024-01-06 PROCEDURE — 93298 REM INTERROG DEV EVAL SCRMS: CPT

## 2024-02-09 ENCOUNTER — APPOINTMENT (OUTPATIENT)
Dept: CARDIOLOGY | Facility: CLINIC | Age: 39
End: 2024-02-09
Payer: COMMERCIAL

## 2024-02-09 ENCOUNTER — NON-APPOINTMENT (OUTPATIENT)
Age: 39
End: 2024-02-09

## 2024-02-10 PROCEDURE — 93298 REM INTERROG DEV EVAL SCRMS: CPT

## 2024-02-12 ENCOUNTER — APPOINTMENT (OUTPATIENT)
Dept: VASCULAR SURGERY | Facility: CLINIC | Age: 39
End: 2024-02-12
Payer: COMMERCIAL

## 2024-02-12 VITALS — SYSTOLIC BLOOD PRESSURE: 137 MMHG | DIASTOLIC BLOOD PRESSURE: 93 MMHG | HEART RATE: 77 BPM

## 2024-02-12 VITALS — WEIGHT: 217 LBS | BODY MASS INDEX: 29.39 KG/M2 | HEIGHT: 72 IN

## 2024-02-12 PROCEDURE — 99203 OFFICE O/P NEW LOW 30 MIN: CPT

## 2024-02-12 NOTE — ASSESSMENT
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  Unknown source of origination.  The patient had elevated protein S.   From my standpoint he has been sufficiently treated with anticoagulation however I recommend he follow-up with hematology for recommendations regarding long-term anticoagulation.  The patient remains asymptomatic no need for a repeat CT scan at this time.  I will see the patient back in my office on PRN basis.

## 2024-02-12 NOTE — PHYSICAL EXAM
[JVD] : no jugular venous distention  [Normal Breath Sounds] : Normal breath sounds [Normal Heart Sounds] : normal heart sounds [2+] : left 2+ [Ankle Swelling (On Exam)] : not present [Varicose Veins Of Lower Extremities] : not present [] : not present

## 2024-02-12 NOTE — HISTORY OF PRESENT ILLNESS
[FreeTextEntry1] : The patient presented to the emergency department in July with chest pain.  The patient underwent a CT scan of his chest abdomen pelvis and showed no evidence of thoracic dissection, 2. Critical stenosis/nonocclusive thrombus of the left gastric artery, with short segment distal reconstitution. 3. Poorly opacified intrahepatic left hepatic artery from the gastric artery is also concerning for critical subtotal or complete occlusion.  The patient during that time was seen by cardiologist had a loop recorder placed as well as an echocardiogram to evaluate for source of emboli.  The patient underwent a hypercoagulable workup and had elevated protein S.  Today he presents for evaluation.  He denies abdominal pain or back pain.

## 2024-02-12 NOTE — DATA REVIEWED
[FreeTextEntry1] : CTA chest abdomen pelvis July 28, 2023  1. No intramural hematoma or aortic dissection. 2. Critical stenosis/nonocclusive thrombus of the left gastric artery, with short segment distal reconstitution. 3. Poorly opacified intrahepatic left hepatic artery from the gastric artery is also concerning for critical subtotal or complete occlusion.

## 2024-03-19 ENCOUNTER — APPOINTMENT (OUTPATIENT)
Dept: ELECTROPHYSIOLOGY | Facility: CLINIC | Age: 39
End: 2024-03-19
Payer: COMMERCIAL

## 2024-03-19 VITALS
HEIGHT: 72 IN | HEART RATE: 77 BPM | SYSTOLIC BLOOD PRESSURE: 115 MMHG | DIASTOLIC BLOOD PRESSURE: 75 MMHG | BODY MASS INDEX: 29.39 KG/M2 | WEIGHT: 217 LBS

## 2024-03-19 DIAGNOSIS — Z45.09 ENCOUNTER FOR ADJUSTMENT AND MANAGEMENT OF OTHER CARDIAC DEVICE: ICD-10-CM

## 2024-03-19 DIAGNOSIS — I49.3 VENTRICULAR PREMATURE DEPOLARIZATION: ICD-10-CM

## 2024-03-19 PROCEDURE — 93285 PRGRMG DEV EVAL SCRMS IP: CPT

## 2024-03-19 PROCEDURE — 93000 ELECTROCARDIOGRAM COMPLETE: CPT | Mod: 59

## 2024-03-19 PROCEDURE — 99214 OFFICE O/P EST MOD 30 MIN: CPT

## 2024-03-19 NOTE — CARDIOLOGY SUMMARY
[de-identified] : 3/19/2024 NSR (HR 77 bpm) 9/19/2023 NSR (HR 84 bpm) 8/1/2023 NSR (HR 77 bpm), iRBBB [de-identified] : 8/1-8/21/23 19 days AVG HR 82 bpm; 3% PVC burden; No AFIB 1 sx reported c/w NSR [de-identified] : 7/27/2023  1. LV Ejection Fraction by Lutz's Method with a biplane EF of 71 %.  2. Normal left atrial size.  3. There is no evidence of pericardial effusion.  4. Structurally normal mitral valve, with normal leaflet excursion.  5. Normal trileaflet aortic valve with normal opening.  6. Color flow doppler and intravenous injection of agitated saline demonstrates the presence of an intact intra atrial septum.  7. Endocardial visualization was enhanced with intravenous echo contrast.

## 2024-03-19 NOTE — PHYSICAL EXAM
[Normal Appearance] : normal appearance [General Appearance - Well Developed] : well developed [Well Groomed] : well groomed [General Appearance - Well Nourished] : well nourished [General Appearance - In No Acute Distress] : no acute distress [No Deformities] : no deformities [Heart Rate And Rhythm] : heart rate and rhythm were normal [Heart Sounds] : normal S1 and S2 [Murmurs] : no murmurs present [] : no respiratory distress [Edema] : no peripheral edema present [Respiration, Rhythm And Depth] : normal respiratory rhythm and effort [Exaggerated Use Of Accessory Muscles For Inspiration] : no accessory muscle use [Auscultation Breath Sounds / Voice Sounds] : lungs were clear to auscultation bilaterally [Clean] : clean [Dry] : dry [Well-Healed] : well-healed [Abdomen Soft] : soft [Nail Clubbing] : no clubbing of the fingernails [FreeTextEntry1] : parasternal

## 2024-03-19 NOTE — PROCEDURE
[See Device Printout] : See device printout [NSR] : normal sinus rhythm [Programmed for Longevity] : output reprogrammed for improved battery longevity [de-identified] : [employer] [de-identified] : MARCIO [de-identified] : [id number] [de-identified] : 8/24/2023 [de-identified] : PVC burden: 1.9 % No events [de-identified] : Good

## 2024-03-19 NOTE — ASSESSMENT
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 Denies s/sx of bleeding. - MCOT with no AFib. S/p ILR. No AF seen. - Remote monitor is set up and patient is transmitting.  I have also advised the patient to go to the nearest emergency room if he experiences any chest pain, dyspnea, syncope, or has any other compelling symptoms.  Follow up in 12 mo.

## 2024-03-19 NOTE — HISTORY OF PRESENT ILLNESS
[de-identified] : Ref: Dr. Prince  39 yo M with history of HL, med noncompliance, GERD s/p EGD (10 years ago, negative for gastritis), admitted to the hospital from 7/27-7/29/2023 for sub-sternal and epigastric chest pain. Patient denied any palpitations. He did report diarrhea for several days that improved with Pepto-Bismol. 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 The study also revealed poorly opacified intrahepatic left hepatic artery from the gastric artery concerning for clinical subtotal or a complete occlusion. Vascular surgery evaluated the patient and recommended hypercoagulable workup. Echo showed normal EF and no PFO. Patient was started on direct oral anticoagulation with Eliquis. GI was consulted and recommended EGD, which patient refused.  Patient presents today for evaluate of left gastric artery thrombus and intrahepatic left hepatic artery subtotal or complete occlusion. Patient denies any history of palpitations or skipped beats. No prior history of blood clots. Possible hypercoagulable state for his mom. Patient has a desk job.   3/2024 He is s/p loop implant and is here for routine follow up. He denies chest pain, palpitations, dyspnea, pre-syncope, or syncope. He has been on Eliquis and denies any issues with bleeding. States he was diagnosed with Protein S deficiency and needs to see a hematologist.

## 2024-04-19 ENCOUNTER — NON-APPOINTMENT (OUTPATIENT)
Age: 39
End: 2024-04-19

## 2024-04-19 ENCOUNTER — APPOINTMENT (OUTPATIENT)
Dept: CARDIOLOGY | Facility: CLINIC | Age: 39
End: 2024-04-19
Payer: COMMERCIAL

## 2024-04-19 PROCEDURE — 93298 REM INTERROG DEV EVAL SCRMS: CPT

## 2024-05-15 ENCOUNTER — EMERGENCY (EMERGENCY)
Facility: HOSPITAL | Age: 39
LOS: 0 days | Discharge: HOME | End: 2024-05-15
Attending: EMERGENCY MEDICINE
Payer: COMMERCIAL

## 2024-05-15 VITALS
SYSTOLIC BLOOD PRESSURE: 122 MMHG | TEMPERATURE: 99 F | HEART RATE: 144 BPM | OXYGEN SATURATION: 99 % | WEIGHT: 220.02 LBS | DIASTOLIC BLOOD PRESSURE: 85 MMHG | RESPIRATION RATE: 19 BRPM

## 2024-05-15 VITALS
DIASTOLIC BLOOD PRESSURE: 70 MMHG | RESPIRATION RATE: 18 BRPM | TEMPERATURE: 98 F | SYSTOLIC BLOOD PRESSURE: 115 MMHG | HEART RATE: 108 BPM | OXYGEN SATURATION: 100 %

## 2024-05-15 DIAGNOSIS — D68.59 OTHER PRIMARY THROMBOPHILIA: ICD-10-CM

## 2024-05-15 DIAGNOSIS — Z20.822 CONTACT WITH AND (SUSPECTED) EXPOSURE TO COVID-19: ICD-10-CM

## 2024-05-15 DIAGNOSIS — R50.9 FEVER, UNSPECIFIED: ICD-10-CM

## 2024-05-15 DIAGNOSIS — R05.9 COUGH, UNSPECIFIED: ICD-10-CM

## 2024-05-15 DIAGNOSIS — M79.652 PAIN IN LEFT THIGH: ICD-10-CM

## 2024-05-15 DIAGNOSIS — R09.81 NASAL CONGESTION: ICD-10-CM

## 2024-05-15 DIAGNOSIS — M79.10 MYALGIA, UNSPECIFIED SITE: ICD-10-CM

## 2024-05-15 DIAGNOSIS — Z79.01 LONG TERM (CURRENT) USE OF ANTICOAGULANTS: ICD-10-CM

## 2024-05-15 DIAGNOSIS — Z91.018 ALLERGY TO OTHER FOODS: ICD-10-CM

## 2024-05-15 DIAGNOSIS — M79.651 PAIN IN RIGHT THIGH: ICD-10-CM

## 2024-05-15 DIAGNOSIS — Z90.49 ACQUIRED ABSENCE OF OTHER SPECIFIED PARTS OF DIGESTIVE TRACT: Chronic | ICD-10-CM

## 2024-05-15 DIAGNOSIS — R00.0 TACHYCARDIA, UNSPECIFIED: ICD-10-CM

## 2024-05-15 LAB
ALBUMIN SERPL ELPH-MCNC: 5 G/DL — SIGNIFICANT CHANGE UP (ref 3.5–5.2)
ALP SERPL-CCNC: 107 U/L — SIGNIFICANT CHANGE UP (ref 30–115)
ALT FLD-CCNC: 30 U/L — SIGNIFICANT CHANGE UP (ref 0–41)
ANION GAP SERPL CALC-SCNC: 12 MMOL/L — SIGNIFICANT CHANGE UP (ref 7–14)
AST SERPL-CCNC: 25 U/L — SIGNIFICANT CHANGE UP (ref 0–41)
BASOPHILS # BLD AUTO: 0.03 K/UL — SIGNIFICANT CHANGE UP (ref 0–0.2)
BASOPHILS NFR BLD AUTO: 0.4 % — SIGNIFICANT CHANGE UP (ref 0–1)
BILIRUB SERPL-MCNC: 1.1 MG/DL — SIGNIFICANT CHANGE UP (ref 0.2–1.2)
BUN SERPL-MCNC: 13 MG/DL — SIGNIFICANT CHANGE UP (ref 10–20)
CALCIUM SERPL-MCNC: 9.1 MG/DL — SIGNIFICANT CHANGE UP (ref 8.4–10.5)
CHLORIDE SERPL-SCNC: 97 MMOL/L — LOW (ref 98–110)
CK SERPL-CCNC: 94 U/L — SIGNIFICANT CHANGE UP (ref 0–225)
CO2 SERPL-SCNC: 28 MMOL/L — SIGNIFICANT CHANGE UP (ref 17–32)
CREAT SERPL-MCNC: 1 MG/DL — SIGNIFICANT CHANGE UP (ref 0.7–1.5)
EGFR: 99 ML/MIN/1.73M2 — SIGNIFICANT CHANGE UP
EOSINOPHIL # BLD AUTO: 0.07 K/UL — SIGNIFICANT CHANGE UP (ref 0–0.7)
EOSINOPHIL NFR BLD AUTO: 1 % — SIGNIFICANT CHANGE UP (ref 0–8)
FLUAV AG NPH QL: SIGNIFICANT CHANGE UP
FLUBV AG NPH QL: SIGNIFICANT CHANGE UP
GLUCOSE SERPL-MCNC: 97 MG/DL — SIGNIFICANT CHANGE UP (ref 70–99)
HCT VFR BLD CALC: 46.9 % — SIGNIFICANT CHANGE UP (ref 42–52)
HGB BLD-MCNC: 16.1 G/DL — SIGNIFICANT CHANGE UP (ref 14–18)
IMM GRANULOCYTES NFR BLD AUTO: 0.4 % — HIGH (ref 0.1–0.3)
LYMPHOCYTES # BLD AUTO: 0.34 K/UL — LOW (ref 1.2–3.4)
LYMPHOCYTES # BLD AUTO: 4.7 % — LOW (ref 20.5–51.1)
MCHC RBC-ENTMCNC: 30.2 PG — SIGNIFICANT CHANGE UP (ref 27–31)
MCHC RBC-ENTMCNC: 34.3 G/DL — SIGNIFICANT CHANGE UP (ref 32–37)
MCV RBC AUTO: 88 FL — SIGNIFICANT CHANGE UP (ref 80–94)
MONOCYTES # BLD AUTO: 0.5 K/UL — SIGNIFICANT CHANGE UP (ref 0.1–0.6)
MONOCYTES NFR BLD AUTO: 6.9 % — SIGNIFICANT CHANGE UP (ref 1.7–9.3)
NEUTROPHILS # BLD AUTO: 6.32 K/UL — SIGNIFICANT CHANGE UP (ref 1.4–6.5)
NEUTROPHILS NFR BLD AUTO: 86.6 % — HIGH (ref 42.2–75.2)
NRBC # BLD: 0 /100 WBCS — SIGNIFICANT CHANGE UP (ref 0–0)
PLATELET # BLD AUTO: 222 K/UL — SIGNIFICANT CHANGE UP (ref 130–400)
PMV BLD: 8.4 FL — SIGNIFICANT CHANGE UP (ref 7.4–10.4)
POTASSIUM SERPL-MCNC: 4 MMOL/L — SIGNIFICANT CHANGE UP (ref 3.5–5)
POTASSIUM SERPL-SCNC: 4 MMOL/L — SIGNIFICANT CHANGE UP (ref 3.5–5)
PROT SERPL-MCNC: 7.8 G/DL — SIGNIFICANT CHANGE UP (ref 6–8)
RBC # BLD: 5.33 M/UL — SIGNIFICANT CHANGE UP (ref 4.7–6.1)
RBC # FLD: 12.8 % — SIGNIFICANT CHANGE UP (ref 11.5–14.5)
RSV RNA NPH QL NAA+NON-PROBE: SIGNIFICANT CHANGE UP
SARS-COV-2 RNA SPEC QL NAA+PROBE: SIGNIFICANT CHANGE UP
SODIUM SERPL-SCNC: 137 MMOL/L — SIGNIFICANT CHANGE UP (ref 135–146)
WBC # BLD: 7.29 K/UL — SIGNIFICANT CHANGE UP (ref 4.8–10.8)
WBC # FLD AUTO: 7.29 K/UL — SIGNIFICANT CHANGE UP (ref 4.8–10.8)

## 2024-05-15 PROCEDURE — 71275 CT ANGIOGRAPHY CHEST: CPT | Mod: MC

## 2024-05-15 PROCEDURE — 85025 COMPLETE CBC W/AUTO DIFF WBC: CPT

## 2024-05-15 PROCEDURE — 99053 MED SERV 10PM-8AM 24 HR FAC: CPT

## 2024-05-15 PROCEDURE — 36415 COLL VENOUS BLD VENIPUNCTURE: CPT

## 2024-05-15 PROCEDURE — 80053 COMPREHEN METABOLIC PANEL: CPT

## 2024-05-15 PROCEDURE — 71275 CT ANGIOGRAPHY CHEST: CPT | Mod: 26,MC

## 2024-05-15 PROCEDURE — 93010 ELECTROCARDIOGRAM REPORT: CPT | Mod: 76

## 2024-05-15 PROCEDURE — 0241U: CPT

## 2024-05-15 PROCEDURE — 96375 TX/PRO/DX INJ NEW DRUG ADDON: CPT | Mod: XU

## 2024-05-15 PROCEDURE — 82550 ASSAY OF CK (CPK): CPT

## 2024-05-15 PROCEDURE — 74177 CT ABD & PELVIS W/CONTRAST: CPT | Mod: MC

## 2024-05-15 PROCEDURE — 74177 CT ABD & PELVIS W/CONTRAST: CPT | Mod: 26,MC

## 2024-05-15 PROCEDURE — 99285 EMERGENCY DEPT VISIT HI MDM: CPT | Mod: 25

## 2024-05-15 PROCEDURE — 99285 EMERGENCY DEPT VISIT HI MDM: CPT

## 2024-05-15 PROCEDURE — 96374 THER/PROPH/DIAG INJ IV PUSH: CPT | Mod: XU

## 2024-05-15 PROCEDURE — 93005 ELECTROCARDIOGRAM TRACING: CPT

## 2024-05-15 RX ORDER — SODIUM CHLORIDE 9 MG/ML
1000 INJECTION INTRAMUSCULAR; INTRAVENOUS; SUBCUTANEOUS ONCE
Refills: 0 | Status: COMPLETED | OUTPATIENT
Start: 2024-05-15 | End: 2024-05-15

## 2024-05-15 RX ORDER — SODIUM CHLORIDE 9 MG/ML
1000 INJECTION INTRAMUSCULAR; INTRAVENOUS; SUBCUTANEOUS
Refills: 0 | Status: DISCONTINUED | OUTPATIENT
Start: 2024-05-15 | End: 2024-05-15

## 2024-05-15 RX ORDER — KETOROLAC TROMETHAMINE 30 MG/ML
15 SYRINGE (ML) INJECTION ONCE
Refills: 0 | Status: DISCONTINUED | OUTPATIENT
Start: 2024-05-15 | End: 2024-05-15

## 2024-05-15 RX ORDER — APIXABAN 2.5 MG/1
1 TABLET, FILM COATED ORAL
Qty: 60 | Refills: 0
Start: 2024-05-15 | End: 2024-06-13

## 2024-05-15 RX ADMIN — SODIUM CHLORIDE 1000 MILLILITER(S): 9 INJECTION INTRAMUSCULAR; INTRAVENOUS; SUBCUTANEOUS at 03:25

## 2024-05-15 RX ADMIN — Medication 15 MILLIGRAM(S): at 02:12

## 2024-05-15 RX ADMIN — Medication 15 MILLIGRAM(S): at 03:25

## 2024-05-15 RX ADMIN — SODIUM CHLORIDE 1000 MILLILITER(S): 9 INJECTION INTRAMUSCULAR; INTRAVENOUS; SUBCUTANEOUS at 02:13

## 2024-05-15 NOTE — ED PROVIDER NOTE - WHICH SHOWED
independent interpretation,   by myself Dr Humphreys, of CXR -  no ptx no opacity not wide  no water bottle heart  I  Dr Humphreys performed independent interpretation of EKG  -  tachy no stemi no acute ischemia

## 2024-05-15 NOTE — ED PROVIDER NOTE - CLINICAL SUMMARY MEDICAL DECISION MAKING FREE TEXT BOX
Endoscope was pre-cleaned at the bedside immediately following procedure by Nicky AVILES 38yM hx of intrahepatic and  gatric artery thrombiosis, followed by hematology, protein S deficiency has appointment in 2 days noncompliant with his Eliquis presents with fever chills myalgia elevated heart rate.  Patient is concerned as he has bilateral upper leg pain and a high heart rate for PE.  Patient denies any shortness of breath or hemoptysis  Labs resulted at the time of my review were noted to be within acceptable parameters  independent interpretation,   by myself Dr Humphreys, of CXR -  no ptx no opacity not wide  no water bottle heart  I  Dr Humphreys performed independent interpretation of EKG  -  nsr no stemi no acute ischemia  CTA  ro PE,  CT APNo acute pathology.  Discussed with patient follow-up with PMD Dr. Prince and hematologist appointment.  Patient requesting ED to reorder his Eliquis as he has been noncompliant.  Offered patient to stay in ED for venous duplex patient does not want a duplex wants us to send in prescription for his Eliquis.  Discussed risks patient understands and will follow-up with his hematologist.

## 2024-05-15 NOTE — ED PROVIDER NOTE - NSFOLLOWUPINSTRUCTIONS_ED_ALL_ED_FT
Follow up with your primary care doctor and your hematologist at scheduled appointment     Fever in Adults    WHAT YOU NEED TO KNOW:    A fever is an increase in your body temperature. Normal body temperature is 98.6°F (37°C). Fever is generally defined as greater than 100.4°F (38°C). Common causes include an infection, injury, or disease such as arthritis.    DISCHARGE INSTRUCTIONS:    Return to the emergency department if:     Your fever does not go away or gets worse even after treatment.      You have a stiff neck and a bad headache.       You are confused. You may not be able to think clearly or remember things like you normally do.       Your heart beats faster than usual even after treatment.      You have shortness of breath or chest pain when you breathe.      You urinate small amounts or not at all.       Your skin, lips, or nails turn blue.     Contact your healthcare provider if:     You have abdominal pain or you feel bloated.      You have nausea or are vomiting.      You have pain or burning when you urinate, or you have pain in your back.      You have questions or concerns about your condition or care.     Medicines: You may need any of the following:     NSAIDs, such as ibuprofen, help decrease swelling, pain, and fever. This medicine is available with or without a doctor's order. NSAIDs can cause stomach bleeding or kidney problems in certain people. If you take blood thinner medicine, always ask if NSAIDs are safe for you. Always read the medicine label and follow directions. Do not give these medicines to children under 6 months of age without direction from your child's healthcare provider.      Acetaminophen decreases pain and fever. It is available without a doctor's order. Ask how much to take and how often to take it. Follow directions. Read the labels of all other medicines you are using to see if they also contain acetaminophen, or ask your doctor or pharmacist. Acetaminophen can cause liver damage if not taken correctly. Do not use more than 4 grams (4,000 milligrams) total of acetaminophen in one day.       Antibiotics may be given if you have an infection caused by bacteria.      Take your medicine as directed. Contact your healthcare provider if you think your medicine is not helping or if you have side effects. Tell him of her if you are allergic to any medicine. Keep a list of the medicines, vitamins, and herbs you take. Include the amounts, and when and why you take them. Bring the list or the pill bottles to follow-up visits. Carry your medicine list with you in case of an emergency.    Follow up with your healthcare provider as directed: Write down your questions so you remember to ask them during your visits.    Self-care:     Drink more liquids as directed. A fever makes you sweat. This can increase your risk for dehydration. Liquids can help prevent dehydration.   Drink at least 6 to 8 eight-ounce cups of clear liquids each day. Drink water, juice, or broth. Do not drink sports drinks. They may contain caffeine.      Ask your healthcare provider if you should drink an oral rehydration solution (ORS). An ORS has the right amounts of water, salts, and sugar you need to replace body fluids.      Dress in lightweight clothes. Shivers may be a sign that your fever is rising. Do not put extra blankets or clothes on. This may cause your fever to rise even higher. Dress in light, comfortable clothing. Use a lightweight blanket or sheet when you sleep. Change your clothes, blanket, or sheets if they get wet.      Cool yourself safely. Take a bath in cool or lukewarm water. Use an ice pack wrapped in a small towel or wet a washcloth with cool water. Place the ice pack or wet washcloth on your forehead or the back of your neck.          © Copyright [employer] 2019 All illustrations and images included in CareNotes are the copyrighted property of [employer] or [employer].

## 2024-05-15 NOTE — ED PROVIDER NOTE - OBJECTIVE STATEMENT
38 year old past medical history of blood clots in abd, protein S was on xarleto but stopped taking it, has hematologist appointment next week comes to emergency room for fever, chills, body/muscle aches and states his HR is elevated. patient is concerned for blood clot in lungs.

## 2024-05-15 NOTE — ED ADULT NURSE NOTE - NSFALLUNIVINTERV_ED_ALL_ED
Bed/Stretcher in lowest position, wheels locked, appropriate side rails in place/Call bell, personal items and telephone in reach/Instruct patient to call for assistance before getting out of bed/chair/stretcher/Non-slip footwear applied when patient is off stretcher/[place] to call system/Physically safe environment - no spills, clutter or unnecessary equipment/Purposeful proactive rounding/Room/bathroom lighting operational, light cord in reach

## 2024-05-15 NOTE — ED PROVIDER NOTE - PATIENT PORTAL LINK FT
You can access the FollowMyHealth Patient Portal offered by [facility] by registering at the following website: [email or web address]. By joining [facility]’s FollowMyHealth portal, you will also be able to view your health information using other applications (apps) compatible with our system.

## 2024-05-20 ENCOUNTER — APPOINTMENT (OUTPATIENT)
Age: 39
End: 2024-05-20

## 2024-05-23 ENCOUNTER — APPOINTMENT (OUTPATIENT)
Age: 39
End: 2024-05-23

## 2024-05-24 ENCOUNTER — NON-APPOINTMENT (OUTPATIENT)
Age: 39
End: 2024-05-24

## 2024-05-24 ENCOUNTER — APPOINTMENT (OUTPATIENT)
Dept: CARDIOLOGY | Facility: CLINIC | Age: 39
End: 2024-05-24
Payer: COMMERCIAL

## 2024-05-24 PROCEDURE — 93298 REM INTERROG DEV EVAL SCRMS: CPT

## 2024-06-10 ENCOUNTER — OUTPATIENT (OUTPATIENT)
Dept: OUTPATIENT SERVICES | Facility: HOSPITAL | Age: 39
LOS: 1 days | End: 2024-06-10
Payer: COMMERCIAL

## 2024-06-10 ENCOUNTER — APPOINTMENT (OUTPATIENT)
Age: 39
End: 2024-06-10
Payer: COMMERCIAL

## 2024-06-10 VITALS
HEIGHT: 72 IN | DIASTOLIC BLOOD PRESSURE: 83 MMHG | HEART RATE: 73 BPM | RESPIRATION RATE: 16 BRPM | BODY MASS INDEX: 29.66 KG/M2 | WEIGHT: 219 LBS | SYSTOLIC BLOOD PRESSURE: 122 MMHG | OXYGEN SATURATION: 96 % | TEMPERATURE: 97.9 F

## 2024-06-10 DIAGNOSIS — I77.1 STRICTURE OF ARTERY: ICD-10-CM

## 2024-06-10 DIAGNOSIS — I82.90 ACUTE EMBOLISM AND THROMBOSIS OF UNSPECIFIED VEIN: ICD-10-CM

## 2024-06-10 PROCEDURE — 99214 OFFICE O/P EST MOD 30 MIN: CPT

## 2024-06-11 DIAGNOSIS — I82.90 ACUTE EMBOLISM AND THROMBOSIS OF UNSPECIFIED VEIN: ICD-10-CM

## 2024-06-24 PROBLEM — I77.1 HEPATIC ARTERY STENOSIS: Status: ACTIVE | Noted: 2024-02-12

## 2024-06-24 NOTE — HISTORY OF PRESENT ILLNESS
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 chest pain or neurologic symptoms Thrombophilia was negative including JAk2 , APS panel , activated protein C resistance , prothrombin gene mutation , floe for [facility] , homocysteine, elevated protein S ( done on eliquis ?  He had negative cardiac CTA ,  echo and loop recorder/.  No Family history of thrombosis

## 2024-06-24 NOTE — ASSESSMENT
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  currently asymptomatic on eliquis . negative thrombophilia work up  Plan ; situation was discussed at length with patient and his spouse . agree to switch to aspirin ( low dose ) repeat protein C , S activity , lupus anticoagulant , carotid doppler screening Aggressive risk modification , lipid lowering medication ,  ( has elevated LDL and low HDL ) , Diet , exercise .

## 2024-06-28 ENCOUNTER — NON-APPOINTMENT (OUTPATIENT)
Age: 39
End: 2024-06-28

## 2024-06-28 ENCOUNTER — APPOINTMENT (OUTPATIENT)
Dept: CARDIOLOGY | Facility: CLINIC | Age: 39
End: 2024-06-28
Payer: COMMERCIAL

## 2024-06-28 PROCEDURE — 93298 REM INTERROG DEV EVAL SCRMS: CPT

## 2024-08-02 ENCOUNTER — NON-APPOINTMENT (OUTPATIENT)
Age: 39
End: 2024-08-02

## 2024-08-02 ENCOUNTER — APPOINTMENT (OUTPATIENT)
Dept: CARDIOLOGY | Facility: CLINIC | Age: 39
End: 2024-08-02
Payer: COMMERCIAL

## 2024-08-02 PROCEDURE — 93298 REM INTERROG DEV EVAL SCRMS: CPT

## 2024-09-06 ENCOUNTER — APPOINTMENT (OUTPATIENT)
Dept: CARDIOLOGY | Facility: CLINIC | Age: 39
End: 2024-09-06
Payer: COMMERCIAL

## 2024-09-06 ENCOUNTER — NON-APPOINTMENT (OUTPATIENT)
Age: 39
End: 2024-09-06

## 2024-09-06 PROCEDURE — 93298 REM INTERROG DEV EVAL SCRMS: CPT

## 2024-09-09 ENCOUNTER — APPOINTMENT (OUTPATIENT)
Age: 39
End: 2024-09-09

## 2024-10-09 ENCOUNTER — APPOINTMENT (OUTPATIENT)
Dept: CARDIOLOGY | Facility: CLINIC | Age: 39
End: 2024-10-09
Payer: COMMERCIAL

## 2024-10-09 ENCOUNTER — NON-APPOINTMENT (OUTPATIENT)
Age: 39
End: 2024-10-09

## 2024-10-09 PROCEDURE — 93298 REM INTERROG DEV EVAL SCRMS: CPT

## 2024-11-13 ENCOUNTER — NON-APPOINTMENT (OUTPATIENT)
Age: 39
End: 2024-11-13

## 2024-11-13 ENCOUNTER — APPOINTMENT (OUTPATIENT)
Dept: CARDIOLOGY | Facility: CLINIC | Age: 39
End: 2024-11-13
Payer: COMMERCIAL

## 2024-11-13 PROCEDURE — 93298 REM INTERROG DEV EVAL SCRMS: CPT

## 2024-12-11 ENCOUNTER — APPOINTMENT (OUTPATIENT)
Dept: ELECTROPHYSIOLOGY | Facility: CLINIC | Age: 39
End: 2024-12-11

## 2024-12-18 ENCOUNTER — APPOINTMENT (OUTPATIENT)
Dept: CARDIOLOGY | Facility: CLINIC | Age: 39
End: 2024-12-18

## 2025-07-14 ENCOUNTER — NON-APPOINTMENT (OUTPATIENT)
Age: 40
End: 2025-07-14

## 2025-07-14 ENCOUNTER — APPOINTMENT (OUTPATIENT)
Dept: ELECTROPHYSIOLOGY | Facility: CLINIC | Age: 40
End: 2025-07-14
Payer: COMMERCIAL

## 2025-07-14 VITALS
SYSTOLIC BLOOD PRESSURE: 118 MMHG | HEART RATE: 99 BPM | HEIGHT: 72 IN | BODY MASS INDEX: 29.8 KG/M2 | WEIGHT: 220 LBS | DIASTOLIC BLOOD PRESSURE: 84 MMHG

## 2025-07-14 PROCEDURE — G2211 COMPLEX E/M VISIT ADD ON: CPT | Mod: NC

## 2025-07-14 PROCEDURE — 99215 OFFICE O/P EST HI 40 MIN: CPT

## 2025-07-14 PROCEDURE — 93285 PRGRMG DEV EVAL SCRMS IP: CPT

## 2025-07-14 RX ORDER — SIMVASTATIN 5 MG/1
5 TABLET, FILM COATED ORAL
Refills: 0 | Status: ACTIVE | COMMUNITY

## 2025-07-14 RX ORDER — KRILL/OM-3/DHA/EPA/PHOSPHO/AST 1000-230MG
81 CAPSULE ORAL
Refills: 0 | Status: ACTIVE | COMMUNITY

## 2025-09-18 ENCOUNTER — APPOINTMENT (OUTPATIENT)
Dept: ELECTROPHYSIOLOGY | Facility: HOSPITAL | Age: 40
End: 2025-09-18